# Patient Record
Sex: FEMALE | Race: WHITE | Employment: OTHER | URBAN - METROPOLITAN AREA
[De-identification: names, ages, dates, MRNs, and addresses within clinical notes are randomized per-mention and may not be internally consistent; named-entity substitution may affect disease eponyms.]

---

## 2017-01-06 ENCOUNTER — TELEPHONE (OUTPATIENT)
Dept: INTERNAL MEDICINE CLINIC | Facility: CLINIC | Age: 67
End: 2017-01-06

## 2017-01-06 NOTE — TELEPHONE ENCOUNTER
Jean/son had to change pt's appt to 1/23-needed late appt due to his work schedule-first available late appt with Dr Lauren Hassan will be out of meds prior to appt  asking if can get enough to hold over until appt?   See 12/12 encounter- will be out of all m

## 2017-01-09 RX ORDER — NORTRIPTYLINE HYDROCHLORIDE 10 MG/1
CAPSULE ORAL
Qty: 60 CAPSULE | Refills: 0 | Status: SHIPPED | OUTPATIENT
Start: 2017-01-09 | End: 2017-01-23

## 2017-01-09 RX ORDER — ATORVASTATIN CALCIUM 10 MG/1
10 TABLET, FILM COATED ORAL DAILY
Qty: 30 TABLET | Refills: 0 | Status: SHIPPED | OUTPATIENT
Start: 2017-01-09 | End: 2017-01-23

## 2017-01-09 RX ORDER — LOSARTAN POTASSIUM 100 MG/1
100 TABLET ORAL DAILY
Qty: 30 TABLET | Refills: 0 | Status: SHIPPED | OUTPATIENT
Start: 2017-01-09 | End: 2017-01-23

## 2017-01-09 RX ORDER — AMLODIPINE BESYLATE 5 MG/1
5 TABLET ORAL DAILY
Qty: 30 TABLET | Refills: 0 | Status: SHIPPED | OUTPATIENT
Start: 2017-01-09 | End: 2017-01-23

## 2017-01-09 RX ORDER — SPIRONOLACTONE AND HYDROCHLOROTHIAZIDE 25; 25 MG/1; MG/1
1 TABLET ORAL DAILY
Qty: 30 TABLET | Refills: 0 | Status: SHIPPED | OUTPATIENT
Start: 2017-01-09 | End: 2017-01-23

## 2017-01-13 ENCOUNTER — TELEPHONE (OUTPATIENT)
Dept: INTERNAL MEDICINE CLINIC | Facility: CLINIC | Age: 67
End: 2017-01-13

## 2017-01-13 NOTE — TELEPHONE ENCOUNTER
Patient needs refills on her 4 meds. Due to changes with insurance, she is required to obtain her refills from [employer]. Betzaida in [address] [place] [address] submitted their request to us via fax, but Rx had been sent to wrong pharmacy.  Pharmacy is unable to g

## 2017-01-13 NOTE — TELEPHONE ENCOUNTER
VO for the medications listed below givewn to Betzaida in [facility], also updated pharmacy info on pt chart.

## 2017-01-23 ENCOUNTER — OFFICE VISIT (OUTPATIENT)
Dept: INTERNAL MEDICINE CLINIC | Facility: CLINIC | Age: 67
End: 2017-01-23

## 2017-01-23 VITALS
RESPIRATION RATE: 18 BRPM | HEART RATE: 97 BPM | DIASTOLIC BLOOD PRESSURE: 74 MMHG | HEIGHT: 65 IN | BODY MASS INDEX: 30.52 KG/M2 | WEIGHT: 183.19 LBS | SYSTOLIC BLOOD PRESSURE: 126 MMHG

## 2017-01-23 DIAGNOSIS — E78.00 HYPERCHOLESTEROLEMIA: ICD-10-CM

## 2017-01-23 DIAGNOSIS — Z23 NEED FOR VACCINATION: ICD-10-CM

## 2017-01-23 DIAGNOSIS — Z12.31 VISIT FOR SCREENING MAMMOGRAM: ICD-10-CM

## 2017-01-23 DIAGNOSIS — I10 ESSENTIAL HYPERTENSION: Primary | ICD-10-CM

## 2017-01-23 DIAGNOSIS — M48.02 SPINAL STENOSIS, CERVICAL REGION: ICD-10-CM

## 2017-01-23 PROCEDURE — 99212 OFFICE O/P EST SF 10 MIN: CPT | Performed by: INTERNAL MEDICINE

## 2017-01-23 PROCEDURE — 90471 IMMUNIZATION ADMIN: CPT | Performed by: INTERNAL MEDICINE

## 2017-01-23 PROCEDURE — 99214 OFFICE O/P EST MOD 30 MIN: CPT | Performed by: INTERNAL MEDICINE

## 2017-01-23 PROCEDURE — 90732 PPSV23 VACC 2 YRS+ SUBQ/IM: CPT | Performed by: INTERNAL MEDICINE

## 2017-01-23 RX ORDER — LOSARTAN POTASSIUM 100 MG/1
100 TABLET ORAL DAILY
Qty: 90 TABLET | Refills: 1 | Status: SHIPPED | OUTPATIENT
Start: 2017-01-23 | End: 2017-02-09

## 2017-01-23 RX ORDER — NORTRIPTYLINE HYDROCHLORIDE 10 MG/1
CAPSULE ORAL
Qty: 180 CAPSULE | Refills: 1 | Status: SHIPPED | OUTPATIENT
Start: 2017-01-23 | End: 2017-08-07

## 2017-01-23 RX ORDER — ATORVASTATIN CALCIUM 10 MG/1
10 TABLET, FILM COATED ORAL DAILY
Qty: 90 TABLET | Refills: 1 | Status: SHIPPED | OUTPATIENT
Start: 2017-01-23 | End: 2017-02-09

## 2017-01-23 RX ORDER — AMLODIPINE BESYLATE 5 MG/1
5 TABLET ORAL DAILY
Qty: 90 TABLET | Refills: 1 | Status: SHIPPED | OUTPATIENT
Start: 2017-01-23 | End: 2017-02-09

## 2017-01-23 RX ORDER — SPIRONOLACTONE AND HYDROCHLOROTHIAZIDE 25; 25 MG/1; MG/1
1 TABLET ORAL DAILY
Qty: 90 TABLET | Refills: 1 | Status: SHIPPED | OUTPATIENT
Start: 2017-01-23 | End: 2017-02-09

## 2017-01-24 NOTE — PROGRESS NOTES
HPI:    Patient ID: Stacey Britt is a 69year old female. HPI Comments: She needs her medications refilled. She refuses a flu shot and mammogram.  She hasn't done blood tests in a while. HTN  This is a chronic problem.  The current episode started mo distress. Neurological: She is alert and oriented to person, place, and time. ASSESSMENT/PLAN:   1. Essential hypertension  Controlled. Continue present management. - Spironolactone-HCTZ 25-25 MG Oral Tab; Take 1 tablet by mouth daily.  HONG

## 2017-01-24 NOTE — PATIENT INSTRUCTIONS
Do fasting labs. Fast for 12 hours. Water is okay. Walk in. Please schedule your mammogram.  Call [phone].

## 2017-02-09 RX ORDER — AMLODIPINE BESYLATE 5 MG/1
TABLET ORAL
Qty: 30 TABLET | Refills: 2 | Status: SHIPPED | OUTPATIENT
Start: 2017-02-09 | End: 2017-09-16

## 2017-02-09 RX ORDER — SPIRONOLACTONE AND HYDROCHLOROTHIAZIDE 25; 25 MG/1; MG/1
TABLET ORAL
Qty: 30 TABLET | Refills: 2 | Status: SHIPPED | OUTPATIENT
Start: 2017-02-09 | End: 2017-09-16

## 2017-02-09 RX ORDER — LOSARTAN POTASSIUM 100 MG/1
TABLET ORAL
Qty: 30 TABLET | Refills: 2 | Status: SHIPPED | OUTPATIENT
Start: 2017-02-09 | End: 2017-09-16

## 2017-02-09 RX ORDER — ATORVASTATIN CALCIUM 10 MG/1
TABLET, FILM COATED ORAL
Qty: 30 TABLET | Refills: 0 | Status: SHIPPED | OUTPATIENT
Start: 2017-02-09 | End: 2017-09-01

## 2017-02-25 ENCOUNTER — LAB ENCOUNTER (OUTPATIENT)
Dept: LAB | Age: 67
End: 2017-02-25
Attending: INTERNAL MEDICINE
Payer: MEDICAID

## 2017-02-25 DIAGNOSIS — E78.00 HYPERCHOLESTEROLEMIA: ICD-10-CM

## 2017-02-25 DIAGNOSIS — I10 ESSENTIAL HYPERTENSION: ICD-10-CM

## 2017-02-25 LAB
ALBUMIN SERPL BCP-MCNC: 4 G/DL (ref 3.5–4.8)
ALBUMIN/GLOB SERPL: 1.3 {RATIO} (ref 1–2)
ALP SERPL-CCNC: 56 U/L (ref 32–100)
ALT SERPL-CCNC: 15 U/L (ref 14–54)
ANION GAP SERPL CALC-SCNC: 8 MMOL/L (ref 0–18)
AST SERPL-CCNC: 17 U/L (ref 15–41)
BASOPHILS # BLD: 0.1 K/UL (ref 0–0.2)
BASOPHILS NFR BLD: 2 %
BILIRUB SERPL-MCNC: 0.6 MG/DL (ref 0.3–1.2)
BUN SERPL-MCNC: 20 MG/DL (ref 8–20)
BUN/CREAT SERPL: 17.4 (ref 10–20)
CALCIUM SERPL-MCNC: 9.3 MG/DL (ref 8.5–10.5)
CHLORIDE SERPL-SCNC: 104 MMOL/L (ref 95–110)
CHOLEST SERPL-MCNC: 154 MG/DL (ref 110–200)
CO2 SERPL-SCNC: 26 MMOL/L (ref 22–32)
CREAT SERPL-MCNC: 1.15 MG/DL (ref 0.5–1.5)
EOSINOPHIL # BLD: 0.1 K/UL (ref 0–0.7)
EOSINOPHIL NFR BLD: 2 %
ERYTHROCYTE [DISTWIDTH] IN BLOOD BY AUTOMATED COUNT: 13.3 % (ref 11–15)
GLOBULIN PLAS-MCNC: 3 G/DL (ref 2.5–3.7)
GLUCOSE SERPL-MCNC: 107 MG/DL (ref 70–99)
HCT VFR BLD AUTO: 35.3 % (ref 35–48)
HDLC SERPL-MCNC: 42 MG/DL
HGB BLD-MCNC: 12 G/DL (ref 12–16)
LDLC SERPL CALC-MCNC: 98 MG/DL (ref 0–99)
LYMPHOCYTES # BLD: 1.4 K/UL (ref 1–4)
LYMPHOCYTES NFR BLD: 27 %
MCH RBC QN AUTO: 31 PG (ref 27–32)
MCHC RBC AUTO-ENTMCNC: 33.9 G/DL (ref 32–37)
MCV RBC AUTO: 91.5 FL (ref 80–100)
MONOCYTES # BLD: 0.3 K/UL (ref 0–1)
MONOCYTES NFR BLD: 6 %
NEUTROPHILS # BLD AUTO: 3.4 K/UL (ref 1.8–7.7)
NEUTROPHILS NFR BLD: 64 %
NONHDLC SERPL-MCNC: 112 MG/DL
OSMOLALITY UR CALC.SUM OF ELEC: 289 MOSM/KG (ref 275–295)
PLATELET # BLD AUTO: 211 K/UL (ref 140–400)
PMV BLD AUTO: 9.3 FL (ref 7.4–10.3)
POTASSIUM SERPL-SCNC: 4.2 MMOL/L (ref 3.3–5.1)
PROT SERPL-MCNC: 7 G/DL (ref 5.9–8.4)
RBC # BLD AUTO: 3.86 M/UL (ref 3.7–5.4)
SODIUM SERPL-SCNC: 138 MMOL/L (ref 136–144)
TRIGL SERPL-MCNC: 72 MG/DL (ref 1–149)
WBC # BLD AUTO: 5.3 K/UL (ref 4–11)

## 2017-02-25 PROCEDURE — 80053 COMPREHEN METABOLIC PANEL: CPT

## 2017-02-25 PROCEDURE — 36415 COLL VENOUS BLD VENIPUNCTURE: CPT

## 2017-02-25 PROCEDURE — 80061 LIPID PANEL: CPT

## 2017-02-25 PROCEDURE — 85025 COMPLETE CBC W/AUTO DIFF WBC: CPT

## 2017-05-31 ENCOUNTER — OFFICE VISIT (OUTPATIENT)
Dept: OPHTHALMOLOGY | Facility: CLINIC | Age: 67
End: 2017-05-31

## 2017-05-31 DIAGNOSIS — H26.8: ICD-10-CM

## 2017-05-31 DIAGNOSIS — H43.393 FLOATER, VITREOUS, BILATERAL: ICD-10-CM

## 2017-05-31 DIAGNOSIS — H40.003 GLAUCOMA SUSPECT OF BOTH EYES: Primary | ICD-10-CM

## 2017-05-31 DIAGNOSIS — H25.13 AGE-RELATED NUCLEAR CATARACT OF BOTH EYES: ICD-10-CM

## 2017-05-31 PROCEDURE — 99243 OFF/OP CNSLTJ NEW/EST LOW 30: CPT | Performed by: OPHTHALMOLOGY

## 2017-05-31 PROCEDURE — 99212 OFFICE O/P EST SF 10 MIN: CPT | Performed by: OPHTHALMOLOGY

## 2017-05-31 NOTE — PROGRESS NOTES
Heather Felix is a 69year old female. HPI:     HPI     Consult    Additional comments: Referred by Dr. Alla Barlow   Patient is here for a complete eye exam.  Patient's son states that patient has no changes or problems with her vision. Allergic/Imm, Heme/Lymph    Last edited by Lesta Dance, O.T. on 5/31/2017  8:33 AM. (History)          PHYSICAL EXAM:     Base Eye Exam     Visual Acuity (Snellen - Linear)      Right Left   Dist cc 20/25 -2 20/25 -1   Near cc 20/20 20/30       Correct eyes. Patient had normal glaucoma diagnostics last year. IOP is normal today. There is no diagnosis of glaucoma at this time, but will follow in 1 year for dilated eye exam and photos.       Pseudoexfoliation of left lens capsule  No treatment is required

## 2017-05-31 NOTE — PATIENT INSTRUCTIONS
Glaucoma suspect of both eyes  Patient is a glaucoma suspect due to increased cupping of the optic nerves in both eyes. Patient had normal glaucoma diagnostics last year. IOP is normal today.   There is no diagnosis of glaucoma at this time, but roverto moses

## 2017-08-04 DIAGNOSIS — M48.02 SPINAL STENOSIS, CERVICAL REGION: ICD-10-CM

## 2017-08-05 DIAGNOSIS — I10 ESSENTIAL HYPERTENSION: ICD-10-CM

## 2017-08-07 RX ORDER — NORTRIPTYLINE HYDROCHLORIDE 10 MG/1
CAPSULE ORAL
Qty: 60 CAPSULE | Refills: 0 | Status: SHIPPED | OUTPATIENT
Start: 2017-08-07 | End: 2017-08-20

## 2017-08-08 RX ORDER — SPIRONOLACTONE AND HYDROCHLOROTHIAZIDE 25; 25 MG/1; MG/1
TABLET ORAL
Qty: 30 TABLET | Refills: 0 | Status: SHIPPED | OUTPATIENT
Start: 2017-08-08 | End: 2017-09-01

## 2017-08-08 RX ORDER — NORTRIPTYLINE HYDROCHLORIDE 10 MG/1
CAPSULE ORAL
Qty: 180 CAPSULE | Refills: 0 | OUTPATIENT
Start: 2017-08-08

## 2017-08-20 RX ORDER — NORTRIPTYLINE HYDROCHLORIDE 10 MG/1
CAPSULE ORAL
Qty: 60 CAPSULE | Refills: 0 | Status: SHIPPED | OUTPATIENT
Start: 2017-08-20 | End: 2017-09-16

## 2017-09-01 DIAGNOSIS — I10 ESSENTIAL HYPERTENSION: ICD-10-CM

## 2017-09-03 RX ORDER — ATORVASTATIN CALCIUM 10 MG/1
TABLET, FILM COATED ORAL
Qty: 30 TABLET | Refills: 0 | Status: SHIPPED | OUTPATIENT
Start: 2017-09-03 | End: 2017-09-16

## 2017-09-03 RX ORDER — SPIRONOLACTONE AND HYDROCHLOROTHIAZIDE 25; 25 MG/1; MG/1
TABLET ORAL
Qty: 30 TABLET | Refills: 0 | Status: SHIPPED | OUTPATIENT
Start: 2017-09-03 | End: 2017-09-16

## 2017-09-04 NOTE — TELEPHONE ENCOUNTER
Refilled per protocol    Hypertensive Medications  Protocol Criteria:  · Appointment scheduled in the past 6 months or in the next 3 months  · BMP or CMP in the past 12 months  · Creatinine result < 2  Recent Outpatient Visits            3 months ago Glauc Qiana Montes MD    Office Visit    7 months ago Essential hypertension    [facility], John Britton MD    Office Visit    11 months ago Glaucoma suspect of both eyes    [facility]

## 2017-09-16 ENCOUNTER — OFFICE VISIT (OUTPATIENT)
Dept: INTERNAL MEDICINE CLINIC | Facility: CLINIC | Age: 67
End: 2017-09-16

## 2017-09-16 ENCOUNTER — APPOINTMENT (OUTPATIENT)
Dept: LAB | Age: 67
End: 2017-09-16
Attending: INTERNAL MEDICINE
Payer: MEDICAID

## 2017-09-16 VITALS
SYSTOLIC BLOOD PRESSURE: 105 MMHG | WEIGHT: 183.63 LBS | BODY MASS INDEX: 30.59 KG/M2 | HEIGHT: 65 IN | DIASTOLIC BLOOD PRESSURE: 69 MMHG | RESPIRATION RATE: 18 BRPM | HEART RATE: 80 BPM

## 2017-09-16 DIAGNOSIS — Z12.31 VISIT FOR SCREENING MAMMOGRAM: ICD-10-CM

## 2017-09-16 DIAGNOSIS — K21.9 GASTROESOPHAGEAL REFLUX DISEASE, ESOPHAGITIS PRESENCE NOT SPECIFIED: Primary | ICD-10-CM

## 2017-09-16 DIAGNOSIS — Z12.11 COLON CANCER SCREENING: ICD-10-CM

## 2017-09-16 DIAGNOSIS — R73.09 ELEVATED GLUCOSE: ICD-10-CM

## 2017-09-16 DIAGNOSIS — E78.00 HYPERCHOLESTEROLEMIA: ICD-10-CM

## 2017-09-16 DIAGNOSIS — Z23 NEED FOR VACCINATION: ICD-10-CM

## 2017-09-16 DIAGNOSIS — I10 ESSENTIAL HYPERTENSION: ICD-10-CM

## 2017-09-16 LAB
ANION GAP SERPL CALC-SCNC: 7 MMOL/L (ref 0–18)
BUN SERPL-MCNC: 29 MG/DL (ref 8–20)
BUN/CREAT SERPL: 23.4 (ref 10–20)
CALCIUM SERPL-MCNC: 9.7 MG/DL (ref 8.5–10.5)
CHLORIDE SERPL-SCNC: 107 MMOL/L (ref 95–110)
CO2 SERPL-SCNC: 23 MMOL/L (ref 22–32)
CREAT SERPL-MCNC: 1.24 MG/DL (ref 0.5–1.5)
GLUCOSE SERPL-MCNC: 109 MG/DL (ref 70–99)
OSMOLALITY UR CALC.SUM OF ELEC: 290 MOSM/KG (ref 275–295)
POTASSIUM SERPL-SCNC: 4.2 MMOL/L (ref 3.3–5.1)
SODIUM SERPL-SCNC: 137 MMOL/L (ref 136–144)

## 2017-09-16 PROCEDURE — 99214 OFFICE O/P EST MOD 30 MIN: CPT | Performed by: INTERNAL MEDICINE

## 2017-09-16 PROCEDURE — 90471 IMMUNIZATION ADMIN: CPT | Performed by: INTERNAL MEDICINE

## 2017-09-16 PROCEDURE — 80048 BASIC METABOLIC PNL TOTAL CA: CPT

## 2017-09-16 PROCEDURE — 99212 OFFICE O/P EST SF 10 MIN: CPT | Performed by: INTERNAL MEDICINE

## 2017-09-16 PROCEDURE — 90653 IIV ADJUVANT VACCINE IM: CPT | Performed by: INTERNAL MEDICINE

## 2017-09-16 PROCEDURE — 83036 HEMOGLOBIN GLYCOSYLATED A1C: CPT

## 2017-09-16 PROCEDURE — 36415 COLL VENOUS BLD VENIPUNCTURE: CPT

## 2017-09-16 RX ORDER — SPIRONOLACTONE AND HYDROCHLOROTHIAZIDE 25; 25 MG/1; MG/1
1 TABLET ORAL
Qty: 90 TABLET | Refills: 0 | Status: SHIPPED | OUTPATIENT
Start: 2017-09-16 | End: 2017-09-16

## 2017-09-16 RX ORDER — ATORVASTATIN CALCIUM 10 MG/1
10 TABLET, FILM COATED ORAL
Qty: 90 TABLET | Refills: 0 | Status: SHIPPED | OUTPATIENT
Start: 2017-09-16 | End: 2017-09-16

## 2017-09-16 RX ORDER — SPIRONOLACTONE AND HYDROCHLOROTHIAZIDE 25; 25 MG/1; MG/1
1 TABLET ORAL
Qty: 90 TABLET | Refills: 1 | Status: SHIPPED | OUTPATIENT
Start: 2017-09-16 | End: 2018-03-20

## 2017-09-16 RX ORDER — RANITIDINE 150 MG/1
150 TABLET ORAL 2 TIMES DAILY
Qty: 60 TABLET | Refills: 11 | Status: SHIPPED | OUTPATIENT
Start: 2017-09-16 | End: 2018-04-09

## 2017-09-16 RX ORDER — LOSARTAN POTASSIUM 100 MG/1
100 TABLET ORAL
Qty: 90 TABLET | Refills: 0 | Status: SHIPPED | OUTPATIENT
Start: 2017-09-16 | End: 2017-09-16

## 2017-09-16 RX ORDER — AMLODIPINE BESYLATE 5 MG/1
5 TABLET ORAL
Qty: 90 TABLET | Refills: 0 | Status: SHIPPED | OUTPATIENT
Start: 2017-09-16 | End: 2017-09-16

## 2017-09-16 RX ORDER — ATORVASTATIN CALCIUM 10 MG/1
10 TABLET, FILM COATED ORAL
Qty: 90 TABLET | Refills: 1 | Status: SHIPPED | OUTPATIENT
Start: 2017-09-16 | End: 2018-03-20

## 2017-09-16 RX ORDER — NORTRIPTYLINE HYDROCHLORIDE 10 MG/1
CAPSULE ORAL
Qty: 60 CAPSULE | Refills: 0 | Status: SHIPPED | OUTPATIENT
Start: 2017-09-16 | End: 2017-09-16

## 2017-09-16 RX ORDER — NORTRIPTYLINE HYDROCHLORIDE 10 MG/1
CAPSULE ORAL
Qty: 180 CAPSULE | Refills: 1 | Status: SHIPPED | OUTPATIENT
Start: 2017-09-16 | End: 2018-03-20

## 2017-09-16 RX ORDER — LOSARTAN POTASSIUM 100 MG/1
100 TABLET ORAL
Qty: 90 TABLET | Refills: 1 | Status: SHIPPED | OUTPATIENT
Start: 2017-09-16 | End: 2018-04-09

## 2017-09-16 RX ORDER — AMLODIPINE BESYLATE 5 MG/1
5 TABLET ORAL
Qty: 90 TABLET | Refills: 1 | Status: SHIPPED | OUTPATIENT
Start: 2017-09-16 | End: 2018-04-09

## 2017-09-16 NOTE — PATIENT INSTRUCTIONS
For Heartburn:  Mylanta for instant relief --not strong  Zantac (ranitidine) 150 mg twice a day--stonger  Nexium or prilosec 20 mg once a day. --most powerful    Tips to Control Acid Reflux    To control acid reflux, you’ll need to make some basic diet and

## 2017-09-16 NOTE — ASSESSMENT & PLAN NOTE
Gave information regarding foods to avoid to prevent GERD symptoms. Discussed minimizing PPI use and changing to an H2 blocker to decrease long term risk. Pt expressed understanding.

## 2017-09-16 NOTE — PROGRESS NOTES
HPI:    Patient ID: Sloane Rivera is a 69year old female. Pt c/o heartburn. She took Nexium in the past which helped. No problems with her other medications. Gastro-esophageal Reflux   She complains of coughing and heartburn.  She reports no abdom History   Problem Relation Age of Onset   • Hypertension Mother    • Heart Disorder Mother    • Glaucoma Brother    • Diabetes Neg    • Cancer Neg    • Macular degeneration Neg        ./67 (BP Location: Right arm, Patient Position: Sitting, Cuff Size (EVN=80251)              Meds This Visit:  Signed Prescriptions Disp Refills    RaNITidine HCl 150 MG Oral Tab 60 tablet 11      Sig: Take 1 tablet (150 mg total) by mouth 2 (two) times daily.       AmLODIPine Besylate 5 MG Oral Tab 90 tablet 1      Sig: Ta

## 2017-09-17 LAB — HBA1C MFR BLD: 6.3 % (ref 4–6)

## 2017-09-23 ENCOUNTER — APPOINTMENT (OUTPATIENT)
Dept: LAB | Age: 67
End: 2017-09-23
Attending: INTERNAL MEDICINE
Payer: MEDICAID

## 2017-09-23 DIAGNOSIS — Z12.11 COLON CANCER SCREENING: ICD-10-CM

## 2017-09-23 LAB — HEMOCCULT STL QL: NEGATIVE

## 2017-09-23 PROCEDURE — 82274 ASSAY TEST FOR BLOOD FECAL: CPT

## 2017-10-05 DIAGNOSIS — I10 ESSENTIAL HYPERTENSION: ICD-10-CM

## 2017-10-06 RX ORDER — SPIRONOLACTONE AND HYDROCHLOROTHIAZIDE 25; 25 MG/1; MG/1
TABLET ORAL
Qty: 30 TABLET | Refills: 0 | OUTPATIENT
Start: 2017-10-06

## 2017-10-06 RX ORDER — ATORVASTATIN CALCIUM 10 MG/1
TABLET, FILM COATED ORAL
Qty: 30 TABLET | Refills: 0 | OUTPATIENT
Start: 2017-10-06

## 2017-10-06 NOTE — TELEPHONE ENCOUNTER
Pending Prescriptions Disp Refills    ATORVASTATIN 10 MG Oral Tab [Pharmacy Med Name: ATORVASTATIN 10MG TABLETS] 30 tablet 0     Sig: TAKE 1 TABLET BY MOUTH DAILY      SPIRONOLACTONE-HCTZ 25-25 MG Oral Tab [Pharmacy Med Name: SPIRONOLACTONE 25MG W/HCTZ 2 specified    Jasmeet Vazquez MD    Office Visit    4 months ago Glaucoma suspect of both eyes    [facility] Jarek Zepeda MD    Office Visit    8 months ago Essential hypertensio

## 2017-11-02 RX ORDER — LOSARTAN POTASSIUM 100 MG/1
TABLET ORAL
Qty: 90 TABLET | Refills: 1 | Status: SHIPPED | OUTPATIENT
Start: 2017-11-02 | End: 2018-04-09

## 2017-11-02 RX ORDER — AMLODIPINE BESYLATE 5 MG/1
TABLET ORAL
Qty: 90 TABLET | Refills: 1 | Status: SHIPPED | OUTPATIENT
Start: 2017-11-02 | End: 2018-04-09

## 2017-11-02 NOTE — TELEPHONE ENCOUNTER
Please advise on refill request.    Hypertensive Medications  Protocol Criteria:  · Appointment scheduled in the past 6 months or in the next 3 months  · BMP or CMP in the past 12 months  · Creatinine result < 2  Recent Outpatient Visits            1 month

## 2018-03-20 DIAGNOSIS — E78.00 HYPERCHOLESTEROLEMIA: ICD-10-CM

## 2018-03-20 DIAGNOSIS — I10 ESSENTIAL HYPERTENSION: ICD-10-CM

## 2018-03-22 RX ORDER — SPIRONOLACTONE AND HYDROCHLOROTHIAZIDE 25; 25 MG/1; MG/1
TABLET ORAL
Qty: 90 TABLET | Refills: 0 | Status: SHIPPED | OUTPATIENT
Start: 2018-03-22 | End: 2018-04-09

## 2018-03-22 RX ORDER — NORTRIPTYLINE HYDROCHLORIDE 10 MG/1
CAPSULE ORAL
Qty: 180 CAPSULE | Refills: 0 | Status: SHIPPED | OUTPATIENT
Start: 2018-03-22 | End: 2018-04-09

## 2018-03-22 NOTE — TELEPHONE ENCOUNTER
Cholesterol Medications  Protocol Criteria:  · Appointment scheduled in the past 12 months or in the next 3 months  · ALT & LDL on file in the past 12 months  · ALT result < 80  · LDL result <130   Recent Outpatient Visits            6 months ago Vandana Gonzalez Sydni Badillo MD    Office Visit    1 year ago Glaucoma suspect of both eyes    [facility] Andbrian Hernandez MD    Office Visit    1 year ago Primary open angle glaucoma of both e 1 year ago Primary open angle glaucoma of both eyes, indeterminate stage    [facility] Moisés Doran MD    Office Visit        Future Appointments       Provider Department Appt Notes    In 2 weeks Felicia Denton,

## 2018-03-23 RX ORDER — ATORVASTATIN CALCIUM 10 MG/1
TABLET, FILM COATED ORAL
Qty: 90 TABLET | Refills: 0 | Status: SHIPPED | OUTPATIENT
Start: 2018-03-23 | End: 2018-04-09

## 2018-04-09 ENCOUNTER — OFFICE VISIT (OUTPATIENT)
Dept: INTERNAL MEDICINE CLINIC | Facility: CLINIC | Age: 68
End: 2018-04-09

## 2018-04-09 ENCOUNTER — LAB ENCOUNTER (OUTPATIENT)
Dept: LAB | Age: 68
End: 2018-04-09
Attending: INTERNAL MEDICINE
Payer: MEDICAID

## 2018-04-09 VITALS
WEIGHT: 185.81 LBS | DIASTOLIC BLOOD PRESSURE: 77 MMHG | SYSTOLIC BLOOD PRESSURE: 117 MMHG | BODY MASS INDEX: 30.96 KG/M2 | HEART RATE: 84 BPM | HEIGHT: 65 IN

## 2018-04-09 DIAGNOSIS — E78.00 HYPERCHOLESTEROLEMIA: ICD-10-CM

## 2018-04-09 DIAGNOSIS — R73.03 PREDIABETES: ICD-10-CM

## 2018-04-09 DIAGNOSIS — Z00.00 ROUTINE HEALTH MAINTENANCE: Primary | ICD-10-CM

## 2018-04-09 DIAGNOSIS — H40.003 GLAUCOMA SUSPECT OF BOTH EYES: ICD-10-CM

## 2018-04-09 DIAGNOSIS — I10 ESSENTIAL HYPERTENSION: ICD-10-CM

## 2018-04-09 DIAGNOSIS — Z12.4 PAPANICOLAOU SMEAR: ICD-10-CM

## 2018-04-09 DIAGNOSIS — Z78.0 ASYMPTOMATIC MENOPAUSE: ICD-10-CM

## 2018-04-09 PROCEDURE — 83036 HEMOGLOBIN GLYCOSYLATED A1C: CPT

## 2018-04-09 PROCEDURE — 99397 PER PM REEVAL EST PAT 65+ YR: CPT | Performed by: INTERNAL MEDICINE

## 2018-04-09 PROCEDURE — 80061 LIPID PANEL: CPT

## 2018-04-09 PROCEDURE — 36415 COLL VENOUS BLD VENIPUNCTURE: CPT

## 2018-04-09 PROCEDURE — 85025 COMPLETE CBC W/AUTO DIFF WBC: CPT

## 2018-04-09 PROCEDURE — 84443 ASSAY THYROID STIM HORMONE: CPT

## 2018-04-09 PROCEDURE — 99213 OFFICE O/P EST LOW 20 MIN: CPT | Performed by: INTERNAL MEDICINE

## 2018-04-09 PROCEDURE — 80053 COMPREHEN METABOLIC PANEL: CPT

## 2018-04-09 RX ORDER — AMLODIPINE BESYLATE 5 MG/1
5 TABLET ORAL
Qty: 90 TABLET | Refills: 1 | Status: SHIPPED | OUTPATIENT
Start: 2018-04-09 | End: 2018-09-27

## 2018-04-09 RX ORDER — NORTRIPTYLINE HYDROCHLORIDE 10 MG/1
CAPSULE ORAL
Qty: 180 CAPSULE | Refills: 1 | Status: SHIPPED | OUTPATIENT
Start: 2018-04-09 | End: 2018-11-15

## 2018-04-09 RX ORDER — RANITIDINE 150 MG/1
150 TABLET ORAL 2 TIMES DAILY
Qty: 60 TABLET | Refills: 11 | Status: SHIPPED | OUTPATIENT
Start: 2018-04-09 | End: 2019-04-09

## 2018-04-09 RX ORDER — SPIRONOLACTONE AND HYDROCHLOROTHIAZIDE 25; 25 MG/1; MG/1
1 TABLET ORAL
Qty: 90 TABLET | Refills: 1 | Status: SHIPPED | OUTPATIENT
Start: 2018-04-09 | End: 2018-11-15

## 2018-04-09 RX ORDER — LOSARTAN POTASSIUM 100 MG/1
100 TABLET ORAL
Qty: 90 TABLET | Refills: 1 | Status: SHIPPED | OUTPATIENT
Start: 2018-04-09 | End: 2018-09-27

## 2018-04-09 RX ORDER — ATORVASTATIN CALCIUM 10 MG/1
TABLET, FILM COATED ORAL
Qty: 90 TABLET | Refills: 1 | Status: SHIPPED | OUTPATIENT
Start: 2018-04-09 | End: 2018-11-15

## 2018-04-09 NOTE — ASSESSMENT & PLAN NOTE
Unremarkable exam.  She is up-to-date on her stool test.  Refuses colonoscopy. Immunizations were reviewed. Counseled pt regarding diet and exercise.

## 2018-04-09 NOTE — PROGRESS NOTES
HPI:    Patient ID: Cat Irvin is a 77year old female. Pt is here for a physical.  She hasn't had a pap in many years. She tries to watch her diet, but her sugar is high. She needs refills. No problems with her blood pressure medication.   She ne mouth once daily. Disp: 90 tablet Rfl: 1   atorvastatin 10 MG Oral Tab TAKE 1 TABLET(10 MG) BY MOUTH EVERY DAY Disp: 90 tablet Rfl: 1   losartan 100 MG Oral Tab Take 1 tablet (100 mg total) by mouth once daily.  Disp: 90 tablet Rfl: 1   Nortriptyline HCl 10 nipple, no mass, no nipple discharge and no skin change. Left breast exhibits no inverted nipple, no mass, no nipple discharge and no skin change. Abdominal: Soft. Bowel sounds are normal. There is no tenderness.    Genitourinary: Rectum normal, vagina no importance of weight loss. Counseled pt regarding low carbohydrate diet.          Relevant Orders    HEMOGLOBIN A1C      Other Visit Diagnoses     Asymptomatic menopause        Relevant Orders    XR DEXA BONE DENSITOMETRY (CPT=77080)              Meds This

## 2018-04-21 ENCOUNTER — HOSPITAL ENCOUNTER (OUTPATIENT)
Dept: BONE DENSITY | Age: 68
Discharge: HOME | End: 2018-04-21
Attending: INTERNAL MEDICINE
Payer: MEDICAID

## 2018-04-21 ENCOUNTER — APPOINTMENT (OUTPATIENT)
Dept: LAB | Age: 68
End: 2018-04-21
Attending: INTERNAL MEDICINE
Payer: MEDICAID

## 2018-04-21 ENCOUNTER — HOSPITAL ENCOUNTER (OUTPATIENT)
Dept: MAMMOGRAPHY | Age: 68
Discharge: HOME | End: 2018-04-21
Attending: INTERNAL MEDICINE
Payer: MEDICAID

## 2018-04-21 DIAGNOSIS — D64.9 ANEMIA, UNSPECIFIED TYPE: ICD-10-CM

## 2018-04-21 DIAGNOSIS — Z12.31 VISIT FOR SCREENING MAMMOGRAM: ICD-10-CM

## 2018-04-21 DIAGNOSIS — Z78.0 ASYMPTOMATIC MENOPAUSE: ICD-10-CM

## 2018-04-21 PROCEDURE — 84466 ASSAY OF TRANSFERRIN: CPT

## 2018-04-21 PROCEDURE — 82746 ASSAY OF FOLIC ACID SERUM: CPT

## 2018-04-21 PROCEDURE — 83540 ASSAY OF IRON: CPT

## 2018-04-21 PROCEDURE — 83021 HEMOGLOBIN CHROMOTOGRAPHY: CPT

## 2018-04-21 PROCEDURE — 77067 SCR MAMMO BI INCL CAD: CPT | Performed by: INTERNAL MEDICINE

## 2018-04-21 PROCEDURE — 82728 ASSAY OF FERRITIN: CPT

## 2018-04-21 PROCEDURE — 83020 HEMOGLOBIN ELECTROPHORESIS: CPT

## 2018-04-21 PROCEDURE — 82607 VITAMIN B-12: CPT

## 2018-04-21 PROCEDURE — 36415 COLL VENOUS BLD VENIPUNCTURE: CPT

## 2018-04-21 PROCEDURE — 77080 DXA BONE DENSITY AXIAL: CPT | Performed by: INTERNAL MEDICINE

## 2018-04-21 PROCEDURE — 84165 PROTEIN E-PHORESIS SERUM: CPT

## 2018-09-27 DIAGNOSIS — I10 ESSENTIAL HYPERTENSION: ICD-10-CM

## 2018-09-28 RX ORDER — LOSARTAN POTASSIUM 100 MG/1
TABLET ORAL
Qty: 90 TABLET | Refills: 0 | Status: SHIPPED | OUTPATIENT
Start: 2018-09-28 | End: 2018-11-19

## 2018-09-28 RX ORDER — AMLODIPINE BESYLATE 5 MG/1
TABLET ORAL
Qty: 90 TABLET | Refills: 0 | Status: SHIPPED | OUTPATIENT
Start: 2018-09-28 | End: 2018-12-15

## 2018-09-29 NOTE — TELEPHONE ENCOUNTER
Hypertensive Medications  Protocol Criteria:  · Appointment scheduled in the past 6 months or in the next 3 months  · BMP or CMP in the past 12 months  · Creatinine result < 2  Recent Outpatient Visits            5 months ago Routine health maintenance

## 2018-11-15 DIAGNOSIS — E78.00 HYPERCHOLESTEROLEMIA: ICD-10-CM

## 2018-11-15 DIAGNOSIS — I10 ESSENTIAL HYPERTENSION: ICD-10-CM

## 2018-11-15 RX ORDER — ATORVASTATIN CALCIUM 10 MG/1
TABLET, FILM COATED ORAL
Qty: 90 TABLET | Refills: 0 | Status: SHIPPED | OUTPATIENT
Start: 2018-11-15 | End: 2018-12-15

## 2018-11-15 RX ORDER — SPIRONOLACTONE AND HYDROCHLOROTHIAZIDE 25; 25 MG/1; MG/1
TABLET ORAL
Qty: 90 TABLET | Refills: 0 | Status: SHIPPED | OUTPATIENT
Start: 2018-11-15 | End: 2018-12-15

## 2018-11-15 RX ORDER — NORTRIPTYLINE HYDROCHLORIDE 10 MG/1
CAPSULE ORAL
Qty: 180 CAPSULE | Refills: 0 | Status: SHIPPED | OUTPATIENT
Start: 2018-11-15 | End: 2019-02-25

## 2018-11-19 DIAGNOSIS — I10 ESSENTIAL HYPERTENSION: ICD-10-CM

## 2018-11-19 RX ORDER — LOSARTAN POTASSIUM 100 MG/1
TABLET ORAL
Qty: 90 TABLET | Refills: 0 | Status: SHIPPED | OUTPATIENT
Start: 2018-11-19 | End: 2018-12-15

## 2018-12-15 ENCOUNTER — LAB ENCOUNTER (OUTPATIENT)
Dept: LAB | Age: 68
End: 2018-12-15
Attending: INTERNAL MEDICINE
Payer: MEDICAID

## 2018-12-15 ENCOUNTER — OFFICE VISIT (OUTPATIENT)
Dept: INTERNAL MEDICINE CLINIC | Facility: CLINIC | Age: 68
End: 2018-12-15
Payer: MEDICAID

## 2018-12-15 ENCOUNTER — MED REC SCAN ONLY (OUTPATIENT)
Dept: INTERNAL MEDICINE CLINIC | Facility: CLINIC | Age: 68
End: 2018-12-15

## 2018-12-15 VITALS
DIASTOLIC BLOOD PRESSURE: 73 MMHG | HEIGHT: 65 IN | HEART RATE: 81 BPM | SYSTOLIC BLOOD PRESSURE: 115 MMHG | BODY MASS INDEX: 30.55 KG/M2 | WEIGHT: 183.38 LBS

## 2018-12-15 DIAGNOSIS — I10 ESSENTIAL HYPERTENSION: ICD-10-CM

## 2018-12-15 DIAGNOSIS — D64.9 ANEMIA, UNSPECIFIED TYPE: Primary | ICD-10-CM

## 2018-12-15 DIAGNOSIS — D64.9 ANEMIA, UNSPECIFIED TYPE: ICD-10-CM

## 2018-12-15 DIAGNOSIS — M85.80 OSTEOPENIA, UNSPECIFIED LOCATION: ICD-10-CM

## 2018-12-15 DIAGNOSIS — R73.03 PREDIABETES: ICD-10-CM

## 2018-12-15 DIAGNOSIS — E78.00 HYPERCHOLESTEROLEMIA: ICD-10-CM

## 2018-12-15 PROCEDURE — 90471 IMMUNIZATION ADMIN: CPT | Performed by: INTERNAL MEDICINE

## 2018-12-15 PROCEDURE — 85025 COMPLETE CBC W/AUTO DIFF WBC: CPT

## 2018-12-15 PROCEDURE — 90653 IIV ADJUVANT VACCINE IM: CPT | Performed by: INTERNAL MEDICINE

## 2018-12-15 PROCEDURE — 83036 HEMOGLOBIN GLYCOSYLATED A1C: CPT

## 2018-12-15 PROCEDURE — 99214 OFFICE O/P EST MOD 30 MIN: CPT | Performed by: INTERNAL MEDICINE

## 2018-12-15 PROCEDURE — 36415 COLL VENOUS BLD VENIPUNCTURE: CPT

## 2018-12-15 PROCEDURE — 80048 BASIC METABOLIC PNL TOTAL CA: CPT

## 2018-12-15 PROCEDURE — 99212 OFFICE O/P EST SF 10 MIN: CPT | Performed by: INTERNAL MEDICINE

## 2018-12-15 RX ORDER — LOSARTAN POTASSIUM 100 MG/1
TABLET ORAL
Qty: 90 TABLET | Refills: 1 | Status: SHIPPED | OUTPATIENT
Start: 2018-12-15 | End: 2020-02-06

## 2018-12-15 RX ORDER — ATORVASTATIN CALCIUM 10 MG/1
TABLET, FILM COATED ORAL
Qty: 90 TABLET | Refills: 1 | Status: SHIPPED | OUTPATIENT
Start: 2018-12-15 | End: 2019-05-19

## 2018-12-15 RX ORDER — SPIRONOLACTONE AND HYDROCHLOROTHIAZIDE 25; 25 MG/1; MG/1
1 TABLET ORAL
Qty: 90 TABLET | Refills: 1 | Status: SHIPPED | OUTPATIENT
Start: 2018-12-15 | End: 2018-12-27 | Stop reason: ALTCHOICE

## 2018-12-15 RX ORDER — AMLODIPINE BESYLATE 5 MG/1
TABLET ORAL
Qty: 90 TABLET | Refills: 1 | Status: SHIPPED | OUTPATIENT
Start: 2018-12-15 | End: 2019-03-18

## 2018-12-15 NOTE — ASSESSMENT & PLAN NOTE
Reviewed recent labs with pt. Discussed with pt and son the diagnosis of prediabetes. Discussed possible progression to diabetes with accompanying complications. Advised pt importance of weight loss. Counseled pt regarding low carbohydrate diet.   Gave

## 2018-12-15 NOTE — ASSESSMENT & PLAN NOTE
The patient has a new onset anemia which is neither iron deficient nor B12 deficient. Other tests were also negative including hemoglobin electrophoresis.   We will recheck a CBC today and if it continues to decrease I will refer her to the hematologist.

## 2018-12-15 NOTE — PROGRESS NOTES
HPI:    Patient ID: Liban Newby is a 77year old female. Pt was sick but it has resolved. She has a new anemia. No bleeding, SOB, CP. Her A1C was 6.4. She is watching her diet. She needs refills on her medications.   Her bone density showed osteope Heart Disorder Mother    • Glaucoma Brother    • Diabetes Neg    • Cancer Neg    • Macular degeneration Neg        ./71 (BP Location: Right arm, Patient Position: Sitting, Cuff Size: large)   Pulse 81   Ht 5' 5\" (1.651 m)   Wt 183 lb 6.4 oz (83.2 kg CBC WITH DIFFERENTIAL WITH PLATELET    OCCULT BLOOD, FECAL, IMMUNOASSAY    Osteopenia     Counseled regarding need for Calcium with D.   Counseled regarding weight-bearing exercise.                   The patient expressed understanding and agreed with th

## 2018-12-15 NOTE — PATIENT INSTRUCTIONS
I recommend that you take calcium carbonate with vitamin D, 1 tabs once a day with food, and have 1 servings of dairy daily. If the calcium supplements are too large to swallow, chewable calcium supplements are available.  You should also do weight melissa

## 2018-12-16 ENCOUNTER — TELEPHONE (OUTPATIENT)
Dept: INTERNAL MEDICINE CLINIC | Facility: CLINIC | Age: 68
End: 2018-12-16

## 2018-12-16 DIAGNOSIS — I10 ESSENTIAL HYPERTENSION: ICD-10-CM

## 2018-12-16 RX ORDER — AMLODIPINE BESYLATE 5 MG/1
TABLET ORAL
Qty: 90 TABLET | Refills: 1 | Status: SHIPPED | OUTPATIENT
Start: 2018-12-16 | End: 2020-03-16

## 2018-12-17 NOTE — TELEPHONE ENCOUNTER
Spoke with patient's son, Alondra Andino, relayed Dr Harrington Gip message. Patient will f/u in March, has appt. Her son will instruct her to stop spironolactone/hydrochlorothiazide, monitor BP once a day at home for one week and send readings through [address]  Contact i

## 2018-12-17 NOTE — TELEPHONE ENCOUNTER
Call pt's son Nigel Dunn (pt does not speak [place].)  Unfortunately her kidney function has worsened. This is likely due to the spironolactone/HCT which has helped her blood pressure.   So I would like to stop that and see what happens with the blood pressure w

## 2018-12-21 ENCOUNTER — APPOINTMENT (OUTPATIENT)
Dept: LAB | Age: 68
End: 2018-12-21
Attending: INTERNAL MEDICINE
Payer: MEDICAID

## 2018-12-21 PROCEDURE — 82274 ASSAY TEST FOR BLOOD FECAL: CPT

## 2018-12-27 ENCOUNTER — OFFICE VISIT (OUTPATIENT)
Dept: HEMATOLOGY/ONCOLOGY | Facility: HOSPITAL | Age: 68
End: 2018-12-27
Attending: INTERNAL MEDICINE
Payer: MEDICAID

## 2018-12-27 VITALS
BODY MASS INDEX: 31.16 KG/M2 | RESPIRATION RATE: 16 BRPM | HEART RATE: 91 BPM | TEMPERATURE: 99 F | HEIGHT: 65 IN | SYSTOLIC BLOOD PRESSURE: 130 MMHG | DIASTOLIC BLOOD PRESSURE: 77 MMHG | WEIGHT: 187 LBS

## 2018-12-27 DIAGNOSIS — D64.9 ANEMIA, UNSPECIFIED TYPE: Primary | ICD-10-CM

## 2018-12-27 PROCEDURE — 99244 OFF/OP CNSLTJ NEW/EST MOD 40: CPT | Performed by: INTERNAL MEDICINE

## 2018-12-28 NOTE — CONSULTS
[facility]    PATIENT'S NAME: Karen Amaya   CONSULTING PHYSICIAN: [address]  Mike Matos MD   PATIENT ACCOUNT #: [de-identified] LOCATION: [address] [place] [address] RECORD #: [id number] YOB: 1950   CONSULTATION DATE: 12/27/2018       CANCER CENTER oriented. VITAL SIGNS:  ECOG performance status 0. Weight 187 pounds, temperature 98.8 degrees Fahrenheit, blood pressure 130/77, pulse 91, respiratory rate 16. HEENT:  Moist mucous membranes. Oropharynx clear. NECK:  Supple.   LUNGS:  Symmetric expans

## 2019-02-26 RX ORDER — NORTRIPTYLINE HYDROCHLORIDE 10 MG/1
CAPSULE ORAL
Qty: 180 CAPSULE | Refills: 0 | Status: SHIPPED | OUTPATIENT
Start: 2019-02-26 | End: 2019-07-17

## 2019-03-16 ENCOUNTER — LAB ENCOUNTER (OUTPATIENT)
Dept: LAB | Age: 69
End: 2019-03-16
Attending: INTERNAL MEDICINE
Payer: MEDICAID

## 2019-03-16 DIAGNOSIS — D64.9 ANEMIA, UNSPECIFIED TYPE: ICD-10-CM

## 2019-03-16 LAB
BASOPHILS # BLD AUTO: 0.05 X10(3) UL (ref 0–0.2)
BASOPHILS NFR BLD AUTO: 1.2 %
DEPRECATED RDW RBC AUTO: 42.8 FL (ref 35.1–46.3)
EOSINOPHIL # BLD AUTO: 0.11 X10(3) UL (ref 0–0.7)
EOSINOPHIL NFR BLD AUTO: 2.5 %
ERYTHROCYTE [DISTWIDTH] IN BLOOD BY AUTOMATED COUNT: 13 % (ref 11–15)
HAPTOGLOB SERPL-MCNC: 144 MG/DL (ref 30–200)
HCT VFR BLD AUTO: 34.1 % (ref 35–48)
HGB BLD-MCNC: 11.4 G/DL (ref 12–16)
HGB RETIC QN AUTO: 34.1 PG (ref 28.2–36.6)
IMM GRANULOCYTES # BLD AUTO: 0.01 X10(3) UL (ref 0–1)
IMM GRANULOCYTES NFR BLD: 0.2 %
IMM RETICS NFR: 0.07 RATIO (ref 0.1–0.3)
LDH SERPL L TO P-CCNC: 200 U/L (ref 84–246)
LYMPHOCYTES # BLD AUTO: 1.37 X10(3) UL (ref 1–4)
LYMPHOCYTES NFR BLD AUTO: 31.6 %
MCH RBC QN AUTO: 30.3 PG (ref 26–34)
MCHC RBC AUTO-ENTMCNC: 33.4 G/DL (ref 31–37)
MCV RBC AUTO: 90.7 FL (ref 80–100)
MONOCYTES # BLD AUTO: 0.33 X10(3) UL (ref 0.1–1)
MONOCYTES NFR BLD AUTO: 7.6 %
NEUTROPHILS # BLD AUTO: 2.46 X10 (3) UL (ref 1.5–7.7)
NEUTROPHILS # BLD AUTO: 2.46 X10(3) UL (ref 1.5–7.7)
NEUTROPHILS NFR BLD AUTO: 56.9 %
PLATELET # BLD AUTO: 270 10(3)UL (ref 150–450)
RBC # BLD AUTO: 3.76 X10(6)UL (ref 3.8–5.3)
RETICS # AUTO: 49.3 X10(3) UL (ref 22.5–147.5)
RETICS/RBC NFR AUTO: 1.3 % (ref 0.5–2.5)
WBC # BLD AUTO: 4.3 X10(3) UL (ref 4–11)

## 2019-03-16 PROCEDURE — 83615 LACTATE (LD) (LDH) ENZYME: CPT

## 2019-03-16 PROCEDURE — 85060 BLOOD SMEAR INTERPRETATION: CPT

## 2019-03-16 PROCEDURE — 85045 AUTOMATED RETICULOCYTE COUNT: CPT

## 2019-03-16 PROCEDURE — 83010 ASSAY OF HAPTOGLOBIN QUANT: CPT

## 2019-03-16 PROCEDURE — 85025 COMPLETE CBC W/AUTO DIFF WBC: CPT

## 2019-03-16 PROCEDURE — 36415 COLL VENOUS BLD VENIPUNCTURE: CPT

## 2019-03-18 ENCOUNTER — OFFICE VISIT (OUTPATIENT)
Dept: INTERNAL MEDICINE CLINIC | Facility: CLINIC | Age: 69
End: 2019-03-18
Payer: MEDICAID

## 2019-03-18 VITALS
SYSTOLIC BLOOD PRESSURE: 135 MMHG | WEIGHT: 191.13 LBS | HEIGHT: 65 IN | DIASTOLIC BLOOD PRESSURE: 79 MMHG | BODY MASS INDEX: 31.85 KG/M2 | HEART RATE: 88 BPM

## 2019-03-18 DIAGNOSIS — D64.9 ANEMIA, UNSPECIFIED TYPE: ICD-10-CM

## 2019-03-18 DIAGNOSIS — E78.00 HYPERCHOLESTEROLEMIA: ICD-10-CM

## 2019-03-18 DIAGNOSIS — R60.0 BILATERAL LOWER EXTREMITY EDEMA: Primary | ICD-10-CM

## 2019-03-18 DIAGNOSIS — Z12.39 BREAST CANCER SCREENING: ICD-10-CM

## 2019-03-18 DIAGNOSIS — R73.03 PREDIABETES: ICD-10-CM

## 2019-03-18 DIAGNOSIS — I10 ESSENTIAL HYPERTENSION: ICD-10-CM

## 2019-03-18 PROCEDURE — 99212 OFFICE O/P EST SF 10 MIN: CPT | Performed by: INTERNAL MEDICINE

## 2019-03-18 PROCEDURE — 99214 OFFICE O/P EST MOD 30 MIN: CPT | Performed by: INTERNAL MEDICINE

## 2019-03-18 RX ORDER — SPIRONOLACTONE AND HYDROCHLOROTHIAZIDE 25; 25 MG/1; MG/1
0.5 TABLET ORAL DAILY
Qty: 45 TABLET | Refills: 1 | Status: SHIPPED | OUTPATIENT
Start: 2019-03-18 | End: 2019-08-19

## 2019-03-18 RX ORDER — SPIRONOLACTONE AND HYDROCHLOROTHIAZIDE 25; 25 MG/1; MG/1
TABLET ORAL
Refills: 1 | COMMUNITY
Start: 2019-02-25 | End: 2019-03-18

## 2019-03-18 NOTE — PROGRESS NOTES
HPI:    Patient ID: Tai Erickson is a 68year old female. She has been doing okay. Her legs have been swelling. She takes the diuretic every other day. It was better when she took it every day. She has mild pain in the legs also.   Blood pressure is History   Problem Relation Age of Onset   • Hypertension Mother    • Heart Disorder Mother    • Glaucoma Brother    • Diabetes Neg    • Cancer Neg    • Macular degeneration Neg        ./77 (BP Location: Right arm, Patient Position: Sitting, Cuff Size Spironolactone-HCTZ 25-25 MG Oral Tab      Other Visit Diagnoses     Breast cancer screening        Relevant Orders    EMBER SCREENING BILSAMUEL (CPT=77067)      Please schedule appointment box before you so that she was embolus which is worse location  On cl

## 2019-03-18 NOTE — PATIENT INSTRUCTIONS
Try anti-embolism stockings such as Futuro or T. E.D hose. Put them on in the morning and take them off at night. Do fasting labs 1 week or so before your next appointment. Fast for 12 hours. Water and meds are okay. Walk in.

## 2019-03-19 NOTE — ASSESSMENT & PLAN NOTE
The patient's edema is well controlled with Aldactazide, however her GFR decreased below 40 when she took a whole tablet daily. We have decreased it to a half tab daily. Her edema is worse but not painful. I advised her to get compression stockings.

## 2019-03-28 ENCOUNTER — OFFICE VISIT (OUTPATIENT)
Dept: HEMATOLOGY/ONCOLOGY | Facility: HOSPITAL | Age: 69
End: 2019-03-28
Attending: INTERNAL MEDICINE
Payer: MEDICAID

## 2019-03-28 VITALS
BODY MASS INDEX: 31.96 KG/M2 | HEIGHT: 65 IN | HEART RATE: 95 BPM | WEIGHT: 191.81 LBS | DIASTOLIC BLOOD PRESSURE: 75 MMHG | RESPIRATION RATE: 18 BRPM | TEMPERATURE: 98 F | SYSTOLIC BLOOD PRESSURE: 142 MMHG

## 2019-03-28 DIAGNOSIS — D64.9 ANEMIA, UNSPECIFIED TYPE: Primary | ICD-10-CM

## 2019-03-28 PROCEDURE — 99213 OFFICE O/P EST LOW 20 MIN: CPT | Performed by: INTERNAL MEDICINE

## 2019-03-28 NOTE — PROGRESS NOTES
Cancer Center Progress Note    Patient Name: Jaziel Velasquez   YOB: 1950   Medical Record Number: [id number]   Attending Physician: Krystina Marrufo M.D.      Chief Complaint:  anemia    History of Present Illness:  39-year-old female being seen by [facility] ([facility]) History    Socioeconomic History      Marital status:        Spouse name: Not on file      Number of children: Not on file      Years of education: Not on file      Highest education level: Not on file    Occupational History      Not on file    Soci BY MOUTH EVERY DAY, Disp: 90 tablet, Rfl: 1  •  RaNITidine HCl 150 MG Oral Tab, Take 1 tablet (150 mg total) by mouth 2 (two) times daily. , Disp: 60 tablet, Rfl: 11  •  Aspirin 325 MG Oral Tab, one tab daily, Disp: , Rfl:     Allergies:  No Known Allergies evidence of hemolysis. –Most recent hemoglobin stable  –Return to clinic in 6 months with repeat. If hemoglobin drops below 10 we will consider bone marrow biopsy.     15 minutes face to face with patient with greater than 50% of time on counseling and co

## 2019-04-22 RX ORDER — NORTRIPTYLINE HYDROCHLORIDE 10 MG/1
CAPSULE ORAL
Qty: 180 CAPSULE | Refills: 0 | OUTPATIENT
Start: 2019-04-22

## 2019-05-19 DIAGNOSIS — E78.00 HYPERCHOLESTEROLEMIA: ICD-10-CM

## 2019-05-20 RX ORDER — ATORVASTATIN CALCIUM 10 MG/1
TABLET, FILM COATED ORAL
Qty: 90 TABLET | Refills: 0 | Status: SHIPPED | OUTPATIENT
Start: 2019-05-20 | End: 2019-08-19

## 2019-07-18 RX ORDER — NORTRIPTYLINE HYDROCHLORIDE 10 MG/1
CAPSULE ORAL
Qty: 180 CAPSULE | Refills: 1 | Status: SHIPPED | OUTPATIENT
Start: 2019-07-18 | End: 2020-01-13

## 2019-07-18 NOTE — TELEPHONE ENCOUNTER
Refill passed per [facility] protocol.   Refill Protocol Appointment Criteria  · Appointment scheduled in the past 6 months or in the next 3 months  Recent Outpatient Visits            3 months ago Anemia, unspecified type    [facility]

## 2019-08-19 DIAGNOSIS — E78.00 HYPERCHOLESTEROLEMIA: ICD-10-CM

## 2019-08-19 DIAGNOSIS — I10 ESSENTIAL HYPERTENSION: ICD-10-CM

## 2019-08-19 DIAGNOSIS — R60.0 BILATERAL LOWER EXTREMITY EDEMA: ICD-10-CM

## 2019-08-22 RX ORDER — ATORVASTATIN CALCIUM 10 MG/1
TABLET, FILM COATED ORAL
Qty: 30 TABLET | Refills: 0 | Status: SHIPPED | OUTPATIENT
Start: 2019-08-22 | End: 2019-09-19

## 2019-08-22 RX ORDER — LOSARTAN POTASSIUM 100 MG/1
TABLET ORAL
Qty: 90 TABLET | Refills: 1 | Status: SHIPPED | OUTPATIENT
Start: 2019-08-22 | End: 2019-10-12

## 2019-08-22 RX ORDER — SPIRONOLACTONE AND HYDROCHLOROTHIAZIDE 25; 25 MG/1; MG/1
TABLET ORAL
Qty: 45 TABLET | Refills: 0 | Status: SHIPPED | OUTPATIENT
Start: 2019-08-22 | End: 2019-11-12

## 2019-09-07 ENCOUNTER — LAB ENCOUNTER (OUTPATIENT)
Dept: LAB | Age: 69
End: 2019-09-07
Attending: INTERNAL MEDICINE
Payer: MEDICAID

## 2019-09-07 DIAGNOSIS — E78.00 HYPERCHOLESTEROLEMIA: ICD-10-CM

## 2019-09-07 DIAGNOSIS — I10 ESSENTIAL HYPERTENSION: ICD-10-CM

## 2019-09-07 DIAGNOSIS — R73.03 PREDIABETES: ICD-10-CM

## 2019-09-07 DIAGNOSIS — D64.9 ANEMIA, UNSPECIFIED TYPE: ICD-10-CM

## 2019-09-07 LAB
ALBUMIN SERPL-MCNC: 3.9 G/DL (ref 3.4–5)
ALBUMIN/GLOB SERPL: 1.1 {RATIO} (ref 1–2)
ALP LIVER SERPL-CCNC: 83 U/L (ref 55–142)
ALT SERPL-CCNC: 23 U/L (ref 13–56)
ANION GAP SERPL CALC-SCNC: 6 MMOL/L (ref 0–18)
AST SERPL-CCNC: 14 U/L (ref 15–37)
BASOPHILS # BLD AUTO: 0.05 X10(3) UL (ref 0–0.2)
BASOPHILS NFR BLD AUTO: 1 %
BILIRUB SERPL-MCNC: 0.5 MG/DL (ref 0.1–2)
BUN BLD-MCNC: 24 MG/DL (ref 7–18)
BUN/CREAT SERPL: 17.9 (ref 10–20)
CALCIUM BLD-MCNC: 9.8 MG/DL (ref 8.5–10.1)
CHLORIDE SERPL-SCNC: 106 MMOL/L (ref 98–112)
CHOLEST SMN-MCNC: 164 MG/DL (ref ?–200)
CO2 SERPL-SCNC: 28 MMOL/L (ref 21–32)
CREAT BLD-MCNC: 1.34 MG/DL (ref 0.55–1.02)
DEPRECATED RDW RBC AUTO: 42.5 FL (ref 35.1–46.3)
EOSINOPHIL # BLD AUTO: 0.12 X10(3) UL (ref 0–0.7)
EOSINOPHIL NFR BLD AUTO: 2.4 %
ERYTHROCYTE [DISTWIDTH] IN BLOOD BY AUTOMATED COUNT: 12.4 % (ref 11–15)
EST. AVERAGE GLUCOSE BLD GHB EST-MCNC: 140 MG/DL (ref 68–126)
GLOBULIN PLAS-MCNC: 3.5 G/DL (ref 2.8–4.4)
GLUCOSE BLD-MCNC: 106 MG/DL (ref 70–99)
HBA1C MFR BLD HPLC: 6.5 % (ref ?–5.7)
HCT VFR BLD AUTO: 34.2 % (ref 35–48)
HDLC SERPL-MCNC: 53 MG/DL (ref 40–59)
HGB BLD-MCNC: 11.4 G/DL (ref 12–16)
IMM GRANULOCYTES # BLD AUTO: 0.02 X10(3) UL (ref 0–1)
IMM GRANULOCYTES NFR BLD: 0.4 %
LDLC SERPL CALC-MCNC: 91 MG/DL (ref ?–100)
LYMPHOCYTES # BLD AUTO: 1.47 X10(3) UL (ref 1–4)
LYMPHOCYTES NFR BLD AUTO: 29.9 %
M PROTEIN MFR SERPL ELPH: 7.4 G/DL (ref 6.4–8.2)
MCH RBC QN AUTO: 31 PG (ref 26–34)
MCHC RBC AUTO-ENTMCNC: 33.3 G/DL (ref 31–37)
MCV RBC AUTO: 92.9 FL (ref 80–100)
MONOCYTES # BLD AUTO: 0.33 X10(3) UL (ref 0.1–1)
MONOCYTES NFR BLD AUTO: 6.7 %
NEUTROPHILS # BLD AUTO: 2.93 X10 (3) UL (ref 1.5–7.7)
NEUTROPHILS # BLD AUTO: 2.93 X10(3) UL (ref 1.5–7.7)
NEUTROPHILS NFR BLD AUTO: 59.6 %
NONHDLC SERPL-MCNC: 111 MG/DL (ref ?–130)
OSMOLALITY SERPL CALC.SUM OF ELEC: 294 MOSM/KG (ref 275–295)
PATIENT FASTING: YES
PATIENT FASTING: YES
PLATELET # BLD AUTO: 219 10(3)UL (ref 150–450)
POTASSIUM SERPL-SCNC: 4.2 MMOL/L (ref 3.5–5.1)
RBC # BLD AUTO: 3.68 X10(6)UL (ref 3.8–5.3)
SODIUM SERPL-SCNC: 140 MMOL/L (ref 136–145)
TRIGL SERPL-MCNC: 101 MG/DL (ref 30–149)
TSI SER-ACNC: 3.52 MIU/ML (ref 0.36–3.74)
VLDLC SERPL CALC-MCNC: 20 MG/DL (ref 0–30)
WBC # BLD AUTO: 4.9 X10(3) UL (ref 4–11)

## 2019-09-07 PROCEDURE — 85025 COMPLETE CBC W/AUTO DIFF WBC: CPT

## 2019-09-07 PROCEDURE — 80061 LIPID PANEL: CPT

## 2019-09-07 PROCEDURE — 36415 COLL VENOUS BLD VENIPUNCTURE: CPT

## 2019-09-07 PROCEDURE — 84443 ASSAY THYROID STIM HORMONE: CPT

## 2019-09-07 PROCEDURE — 80053 COMPREHEN METABOLIC PANEL: CPT

## 2019-09-07 PROCEDURE — 83036 HEMOGLOBIN GLYCOSYLATED A1C: CPT

## 2019-09-19 ENCOUNTER — OFFICE VISIT (OUTPATIENT)
Dept: HEMATOLOGY/ONCOLOGY | Facility: HOSPITAL | Age: 69
End: 2019-09-19
Attending: INTERNAL MEDICINE
Payer: MEDICAID

## 2019-09-19 VITALS
BODY MASS INDEX: 32.14 KG/M2 | OXYGEN SATURATION: 96 % | RESPIRATION RATE: 18 BRPM | HEIGHT: 65 IN | SYSTOLIC BLOOD PRESSURE: 159 MMHG | TEMPERATURE: 98 F | WEIGHT: 192.88 LBS | HEART RATE: 90 BPM | DIASTOLIC BLOOD PRESSURE: 90 MMHG

## 2019-09-19 DIAGNOSIS — D64.9 ANEMIA, UNSPECIFIED TYPE: Primary | ICD-10-CM

## 2019-09-19 DIAGNOSIS — E78.00 HYPERCHOLESTEROLEMIA: ICD-10-CM

## 2019-09-19 PROCEDURE — 99214 OFFICE O/P EST MOD 30 MIN: CPT | Performed by: INTERNAL MEDICINE

## 2019-09-19 RX ORDER — ATORVASTATIN CALCIUM 10 MG/1
TABLET, FILM COATED ORAL
Qty: 90 TABLET | Refills: 1 | Status: SHIPPED | OUTPATIENT
Start: 2019-09-19 | End: 2020-03-10

## 2019-09-19 NOTE — PROGRESS NOTES
Cancer Center Progress Note    Patient Name: Shoshana Hahn   YOB: 1950   Medical Record Number: [id number]   Attending Physician: Tula Severs, M.D.      Chief Complaint:  anemia    History of Present Illness:  33-year-old female being seen by [facility] ([facility]) History    Socioeconomic History      Marital status:        Spouse name: Not on file      Number of children: Not on file      Years of education: Not on file      Highest education level: Not on file    Occupational History      Not on file    Soci TABLET(5 MG) BY MOUTH EVERY DAY, Disp: 90 tablet, Rfl: 1  •  losartan 100 MG Oral Tab, TAKE 1 TABLET(100 MG) BY MOUTH EVERY DAY, Disp: 90 tablet, Rfl: 1  •  Aspirin 325 MG Oral Tab, one tab daily, Disp: , Rfl:     Allergies:  No Known Allergies     Review evidence of hemolysis. –Most recent hemoglobin stable  –Return to clinic in 6-8 months with repeat cbc. If hemoglobin drops below 10 we will consider bone marrow biopsy.     15 minutes face to face with patient with greater than 50% of time on counseling

## 2019-09-20 NOTE — TELEPHONE ENCOUNTER
Refill passed per [facility] protocol.   Cholesterol Medications  Protocol Criteria:  · Appointment scheduled in the past 12 months or in the next 3 months  · ALT & LDL on file in the past 12 months  · ALT result < 80  · LDL result <130   Recent Outpat

## 2019-10-12 ENCOUNTER — APPOINTMENT (OUTPATIENT)
Dept: CT IMAGING | Facility: HOSPITAL | Age: 69
End: 2019-10-12
Attending: EMERGENCY MEDICINE
Payer: MEDICAID

## 2019-10-12 ENCOUNTER — APPOINTMENT (OUTPATIENT)
Dept: GENERAL RADIOLOGY | Facility: HOSPITAL | Age: 69
End: 2019-10-12
Attending: EMERGENCY MEDICINE
Payer: MEDICAID

## 2019-10-12 ENCOUNTER — HOSPITAL ENCOUNTER (EMERGENCY)
Facility: HOSPITAL | Age: 69
Discharge: HOME | End: 2019-10-12
Attending: EMERGENCY MEDICINE
Payer: MEDICAID

## 2019-10-12 ENCOUNTER — OFFICE VISIT (OUTPATIENT)
Dept: INTERNAL MEDICINE CLINIC | Facility: CLINIC | Age: 69
End: 2019-10-12
Payer: MEDICAID

## 2019-10-12 VITALS
OXYGEN SATURATION: 95 % | DIASTOLIC BLOOD PRESSURE: 82 MMHG | WEIGHT: 197 LBS | RESPIRATION RATE: 23 BRPM | TEMPERATURE: 99 F | SYSTOLIC BLOOD PRESSURE: 128 MMHG | BODY MASS INDEX: 33 KG/M2 | HEART RATE: 96 BPM

## 2019-10-12 VITALS
HEART RATE: 82 BPM | WEIGHT: 194.13 LBS | DIASTOLIC BLOOD PRESSURE: 91 MMHG | HEIGHT: 65 IN | SYSTOLIC BLOOD PRESSURE: 147 MMHG | BODY MASS INDEX: 32.35 KG/M2

## 2019-10-12 DIAGNOSIS — T50.905A ADVERSE EFFECT OF DRUG, INITIAL ENCOUNTER: ICD-10-CM

## 2019-10-12 DIAGNOSIS — R07.89 CHEST PAIN, ATYPICAL: Primary | ICD-10-CM

## 2019-10-12 DIAGNOSIS — R60.0 BILATERAL LOWER EXTREMITY EDEMA: ICD-10-CM

## 2019-10-12 DIAGNOSIS — E11.9 CONTROLLED TYPE 2 DIABETES MELLITUS WITHOUT COMPLICATION, WITHOUT LONG-TERM CURRENT USE OF INSULIN (HCC): Primary | ICD-10-CM

## 2019-10-12 DIAGNOSIS — I10 ESSENTIAL HYPERTENSION: ICD-10-CM

## 2019-10-12 DIAGNOSIS — Z12.31 ENCOUNTER FOR SCREENING MAMMOGRAM FOR MALIGNANT NEOPLASM OF BREAST: ICD-10-CM

## 2019-10-12 PROCEDURE — 85379 FIBRIN DEGRADATION QUANT: CPT | Performed by: EMERGENCY MEDICINE

## 2019-10-12 PROCEDURE — 84484 ASSAY OF TROPONIN QUANT: CPT | Performed by: EMERGENCY MEDICINE

## 2019-10-12 PROCEDURE — 90471 IMMUNIZATION ADMIN: CPT | Performed by: INTERNAL MEDICINE

## 2019-10-12 PROCEDURE — 99285 EMERGENCY DEPT VISIT HI MDM: CPT

## 2019-10-12 PROCEDURE — 96360 HYDRATION IV INFUSION INIT: CPT

## 2019-10-12 PROCEDURE — 93005 ELECTROCARDIOGRAM TRACING: CPT

## 2019-10-12 PROCEDURE — 87631 RESP VIRUS 3-5 TARGETS: CPT | Performed by: EMERGENCY MEDICINE

## 2019-10-12 PROCEDURE — 71045 X-RAY EXAM CHEST 1 VIEW: CPT | Performed by: EMERGENCY MEDICINE

## 2019-10-12 PROCEDURE — 81001 URINALYSIS AUTO W/SCOPE: CPT | Performed by: EMERGENCY MEDICINE

## 2019-10-12 PROCEDURE — 99214 OFFICE O/P EST MOD 30 MIN: CPT | Performed by: INTERNAL MEDICINE

## 2019-10-12 PROCEDURE — 93010 ELECTROCARDIOGRAM REPORT: CPT | Performed by: EMERGENCY MEDICINE

## 2019-10-12 PROCEDURE — 80048 BASIC METABOLIC PNL TOTAL CA: CPT | Performed by: EMERGENCY MEDICINE

## 2019-10-12 PROCEDURE — 87086 URINE CULTURE/COLONY COUNT: CPT | Performed by: EMERGENCY MEDICINE

## 2019-10-12 PROCEDURE — 85025 COMPLETE CBC W/AUTO DIFF WBC: CPT | Performed by: EMERGENCY MEDICINE

## 2019-10-12 PROCEDURE — 96361 HYDRATE IV INFUSION ADD-ON: CPT

## 2019-10-12 PROCEDURE — 71260 CT THORAX DX C+: CPT | Performed by: EMERGENCY MEDICINE

## 2019-10-12 PROCEDURE — 90662 IIV NO PRSV INCREASED AG IM: CPT | Performed by: INTERNAL MEDICINE

## 2019-10-12 RX ORDER — ACETAMINOPHEN 500 MG
1000 TABLET ORAL ONCE
Status: COMPLETED | OUTPATIENT
Start: 2019-10-12 | End: 2019-10-12

## 2019-10-12 RX ORDER — DILTIAZEM HYDROCHLORIDE 180 MG/1
180 CAPSULE, COATED, EXTENDED RELEASE ORAL DAILY
Qty: 90 CAPSULE | Refills: 1 | Status: SHIPPED | OUTPATIENT
Start: 2019-10-12 | End: 2020-04-06

## 2019-10-12 NOTE — ED INITIAL ASSESSMENT (HPI)
pts family reports pt was at Drs office today for routine checkup. Pt received the flu vaccine. Shortly after they go home pt started c/o chest pain and shortness of breath.

## 2019-10-12 NOTE — PATIENT INSTRUCTIONS
Get knee high compression stockings 10-15 mmHg pressure. Do non-fasting labs prior to the next appointment. Please schedule that appointment for January. - Pt with acute fracture of L distal radius now s/p splinting in ED  - Spoke with orthopedic resident who states that Xrays have been reviewed and that patient is stable for outpatient ortho follow up with Dr. Marek Perdomo prn mod-severe pain  - PT eval-- recommend home PT  - APRYL CORDOVA for now - Pt with acute fracture of L distal radius now s/p splinting in ED  - Spoke with orthopedic resident who states that repeat Xrays have been reviewed and that patient is stable for outpatient ortho follow up with Dr. Marek horne for pain  - PT eval-- recommend home PT, patient declined home care services  - APRYL CORDOVA pending outpt ortho eval

## 2019-10-12 NOTE — ED PROVIDER NOTES
Patient Seen in: [facility] AND [facility] Emergency Department      History   Patient presents with:  Chest Pain Angina (cardiovascular)    Stated Complaint: CHEST PAIN, SOB    HPI    Patient is a 77-year-old female who arrives with her son for sudden onset leland normal  Neck: supple, non tender, no meningeal signs  CV: tachycardic, no murmurs  Resp: CTAB, no wheezes or retractions  Ab: soft, nontender, no distension  Extremities: FROM of all extremities, no cyanosis/clubbing/edema  Neuro: CN intact, normal speech, EKG    Rate, intervals and axes as noted on EKG Report.   Rate: 120  Rhythm: Sinus Rhythm  Reading: no ALBERTINA           MDM     Pulse Ox: 96%, Normal, RA    Cardiac Monitor: Pulse Readings from Last 1 Encounters:  10/12/19 : 96  , sinus     Radiology findi atypical  (primary encounter diagnosis)  Adverse effect of drug, initial encounter    Disposition:  Discharge  10/12/2019  8:26 pm    Follow-up:  MD Jennyfer Henley [address] [phone]    In 2 days      We recommend that yo

## 2019-10-12 NOTE — ASSESSMENT & PLAN NOTE
The patient has normal blood pressures at home, however when she is here her blood pressure is high. She stopped the Norvasc due to swelling. We will add diltiazem 180 mg daily and follow-up in 3 months.

## 2019-10-12 NOTE — ASSESSMENT & PLAN NOTE
Patient's A1c has been 6.5 twice now. She is aware of the need for a low carbohydrate diet, but has not lost weight. I will have her see the diabetic instructor  She is due to see the eye doctor.   I have given her referral.

## 2019-10-12 NOTE — ASSESSMENT & PLAN NOTE
Patient had worsening edema this summer on the amlodipine which she had stopped. That has since improved and the weather is colder now. I advised compression stockings.

## 2019-10-12 NOTE — PROGRESS NOTES
HPI:    Patient ID: Ismael Jacobs is a 68year old female. Pt went to [facility] this summer and had leg swelling and a rash. She saw a pharmacist who told her it was due to the amlodipine so she stopped it.   Her blood pressure is normal at home--around  1 180 capsule, Rfl: 1  losartan 100 MG Oral Tab, TAKE 1 TABLET(100 MG) BY MOUTH EVERY DAY, Disp: 90 tablet, Rfl: 1  Aspirin 325 MG Oral Tab, one tab daily, Disp: , Rfl:   AMLODIPINE BESYLATE 5 MG Oral Tab, TAKE 1 TABLET(5 MG) BY MOUTH EVERY DAY, Disp: 90 tab (hypertension)     The patient has normal blood pressures at home, however when she is here her blood pressure is high. She stopped the Norvasc due to swelling. We will add diltiazem 180 mg daily and follow-up in 3 months.          Relevant Medications

## 2019-11-12 DIAGNOSIS — R60.0 BILATERAL LOWER EXTREMITY EDEMA: ICD-10-CM

## 2019-11-12 DIAGNOSIS — I10 ESSENTIAL HYPERTENSION: ICD-10-CM

## 2019-11-14 RX ORDER — SPIRONOLACTONE AND HYDROCHLOROTHIAZIDE 25; 25 MG/1; MG/1
TABLET ORAL
Qty: 45 TABLET | Refills: 1 | Status: SHIPPED | OUTPATIENT
Start: 2019-11-14 | End: 2020-05-03

## 2019-11-14 RX ORDER — AMLODIPINE BESYLATE 5 MG/1
TABLET ORAL
Qty: 90 TABLET | Refills: 1 | Status: SHIPPED | OUTPATIENT
Start: 2019-11-14 | End: 2021-08-30

## 2019-11-15 NOTE — TELEPHONE ENCOUNTER
Sent to provider to review high alert message noted per Epic    The risk of hyperkalemia may be increased when potassium-sparing diuretics are co-administered with angiotensin II receptor antagonists      Refill passed per [facility] protocol.   Hypert

## 2020-01-13 RX ORDER — NORTRIPTYLINE HYDROCHLORIDE 10 MG/1
CAPSULE ORAL
Qty: 180 CAPSULE | Refills: 1 | Status: SHIPPED | OUTPATIENT
Start: 2020-01-13 | End: 2020-07-09

## 2020-02-05 DIAGNOSIS — I10 ESSENTIAL HYPERTENSION: ICD-10-CM

## 2020-02-06 RX ORDER — LOSARTAN POTASSIUM 100 MG/1
TABLET ORAL
Qty: 90 TABLET | Refills: 1 | Status: SHIPPED | OUTPATIENT
Start: 2020-02-06 | End: 2020-08-04

## 2020-02-07 NOTE — TELEPHONE ENCOUNTER
Refill passed per [facility] protocol. Dr KIRSTIE deutsch passed protocol but warning with spironolactonze-hctz.     Hypertensive Medications  Protocol Criteria:  · Appointment scheduled in the past 6 months or in the next 3 months  · BMP or CMP in the past 12

## 2020-02-27 ENCOUNTER — OFFICE VISIT (OUTPATIENT)
Dept: OPHTHALMOLOGY | Facility: CLINIC | Age: 70
End: 2020-02-27
Payer: MEDICAID

## 2020-02-27 DIAGNOSIS — H43.393 FLOATER, VITREOUS, BILATERAL: ICD-10-CM

## 2020-02-27 DIAGNOSIS — H25.13 AGE-RELATED NUCLEAR CATARACT OF BOTH EYES: ICD-10-CM

## 2020-02-27 DIAGNOSIS — H26.8: ICD-10-CM

## 2020-02-27 DIAGNOSIS — H40.003 GLAUCOMA SUSPECT OF BOTH EYES: Primary | ICD-10-CM

## 2020-02-27 PROCEDURE — 92015 DETERMINE REFRACTIVE STATE: CPT | Performed by: OPHTHALMOLOGY

## 2020-02-27 PROCEDURE — 92014 COMPRE OPH EXAM EST PT 1/>: CPT | Performed by: OPHTHALMOLOGY

## 2020-02-27 PROCEDURE — 92250 FUNDUS PHOTOGRAPHY W/I&R: CPT | Performed by: OPHTHALMOLOGY

## 2020-02-27 NOTE — PATIENT INSTRUCTIONS
Glaucoma suspect of both eyes  Discussed with patient that she is a glaucoma suspect based on increased cupping of the optic nerves in both eyes, pseudoexfoliation syndrome and history of treatment for glaucoma.    Retinal photos taken today to document opt the eye suddenly becomes completely blocked. Eye pressure builds rapidly. You may notice blurred vision and rainbow halos around lights. You may also have headaches, nausea, vomiting, and severe pain.  If not treated right away, blindness can happen quickly signals from the retina to the brain and allows you to see visual images. Eye fluid is constantly produced within the eye.  Excess fluid drains out into the bloodstream.  Open-angle glaucoma is a condition where the fluid pressure in the eye slowly increase pressure. Talk to your healthcare provider about an exercise program that’s right for you. · Protect your eyes. An eye injury can cause increased eye pressure.  Wear safety glasses or goggles when you play sports, use tools or machinery, or work with [employer] vision. Date Last Reviewed: 11/1/2017  © 4455-4389 [employer]. [address] [facility], [facility] [employer]. All rights reserved. This information is not intended as a substitute for professional medical care.  Always follow your healthcare pro

## 2020-02-27 NOTE — ASSESSMENT & PLAN NOTE
Discussed with patient that she is a glaucoma suspect based on increased cupping of the optic nerves in both eyes, pseudoexfoliation syndrome and history of treatment for glaucoma. Retinal photos taken today to document optic nerves.   Glaucoma diagnostic

## 2020-02-27 NOTE — PROGRESS NOTES
Ismael Jacobs is a 65year old female. HPI:     HPI     Consult      Additional comments: Per Dr Arianna Garcia is here for a complete exam and photos.  Pt's son states that she thinks there may be a slight change in her vision sinc tab daily         Allergies:  No Known Allergies    ROS:     ROS     Positive for: Eyes    Negative for: Constitutional, Gastrointestinal, Neurological, Skin, Genitourinary, Musculoskeletal, HENT, Endocrine, Cardiovascular, Respiratory, Psychiatric, Allerg Manifest Refraction       Sphere Cylinder [place]    Right +1.00 +0.75 160  20/25- +3.00 20/20    Left +1.00 +0.75 180 20/30+ +3.00 20/30          Final Rx       Sphere [employer] [place] [facility] [place]    Right +1.00 +0.75 160  2

## 2020-03-08 DIAGNOSIS — E78.00 HYPERCHOLESTEROLEMIA: ICD-10-CM

## 2020-03-10 RX ORDER — ATORVASTATIN CALCIUM 10 MG/1
TABLET, FILM COATED ORAL
Qty: 90 TABLET | Refills: 1 | Status: SHIPPED | OUTPATIENT
Start: 2020-03-10 | End: 2020-08-27

## 2020-03-11 NOTE — TELEPHONE ENCOUNTER
Refill passed per [facility] protocol.   Cholesterol Medications  Protocol Criteria:  · Appointment scheduled in the past 12 months or in the next 3 months  · ALT & LDL on file in the past 12 months  · ALT result < 80  · LDL result <130   Recent Outpat

## 2020-03-16 ENCOUNTER — APPOINTMENT (OUTPATIENT)
Dept: LAB | Age: 70
End: 2020-03-16
Attending: INTERNAL MEDICINE
Payer: MEDICAID

## 2020-03-16 ENCOUNTER — OFFICE VISIT (OUTPATIENT)
Dept: INTERNAL MEDICINE CLINIC | Facility: CLINIC | Age: 70
End: 2020-03-16
Payer: MEDICAID

## 2020-03-16 VITALS
HEART RATE: 101 BPM | HEIGHT: 65 IN | DIASTOLIC BLOOD PRESSURE: 61 MMHG | BODY MASS INDEX: 31.49 KG/M2 | SYSTOLIC BLOOD PRESSURE: 93 MMHG | WEIGHT: 189 LBS

## 2020-03-16 DIAGNOSIS — Z12.11 SCREEN FOR COLON CANCER: ICD-10-CM

## 2020-03-16 DIAGNOSIS — E11.9 CONTROLLED TYPE 2 DIABETES MELLITUS WITHOUT COMPLICATION, WITHOUT LONG-TERM CURRENT USE OF INSULIN (HCC): ICD-10-CM

## 2020-03-16 DIAGNOSIS — Z12.31 ENCOUNTER FOR SCREENING MAMMOGRAM FOR MALIGNANT NEOPLASM OF BREAST: ICD-10-CM

## 2020-03-16 DIAGNOSIS — N94.9 VAGINAL DISCOMFORT: ICD-10-CM

## 2020-03-16 DIAGNOSIS — N94.9 VAGINAL DISCOMFORT: Primary | ICD-10-CM

## 2020-03-16 LAB
ANION GAP SERPL CALC-SCNC: 8 MMOL/L (ref 0–18)
BACTERIA UR QL AUTO: NEGATIVE /HPF
BILIRUB UR QL: NEGATIVE
BUN BLD-MCNC: 22 MG/DL (ref 7–18)
BUN/CREAT SERPL: 13 (ref 10–20)
CALCIUM BLD-MCNC: 9.6 MG/DL (ref 8.5–10.1)
CHLORIDE SERPL-SCNC: 106 MMOL/L (ref 98–112)
CLARITY UR: CLEAR
CO2 SERPL-SCNC: 27 MMOL/L (ref 21–32)
COLOR UR: YELLOW
CREAT BLD-MCNC: 1.69 MG/DL (ref 0.55–1.02)
EST. AVERAGE GLUCOSE BLD GHB EST-MCNC: 134 MG/DL (ref 68–126)
GLUCOSE BLD-MCNC: 108 MG/DL (ref 70–99)
GLUCOSE UR-MCNC: NEGATIVE MG/DL
HBA1C MFR BLD HPLC: 6.3 % (ref ?–5.7)
KETONES UR-MCNC: NEGATIVE MG/DL
LEUKOCYTE ESTERASE UR QL STRIP.AUTO: NEGATIVE
NITRITE UR QL STRIP.AUTO: NEGATIVE
OSMOLALITY SERPL CALC.SUM OF ELEC: 296 MOSM/KG (ref 275–295)
PATIENT FASTING Y/N/NP: YES
PH UR: 7 [PH] (ref 5–8)
POTASSIUM SERPL-SCNC: 4.2 MMOL/L (ref 3.5–5.1)
PROT UR-MCNC: NEGATIVE MG/DL
RBC #/AREA URNS AUTO: 3 /HPF
SODIUM SERPL-SCNC: 141 MMOL/L (ref 136–145)
SP GR UR STRIP: 1.01 (ref 1–1.03)
UROBILINOGEN UR STRIP-ACNC: <2
WBC #/AREA URNS AUTO: 1 /HPF

## 2020-03-16 PROCEDURE — 87086 URINE CULTURE/COLONY COUNT: CPT

## 2020-03-16 PROCEDURE — 83036 HEMOGLOBIN GLYCOSYLATED A1C: CPT

## 2020-03-16 PROCEDURE — 36415 COLL VENOUS BLD VENIPUNCTURE: CPT

## 2020-03-16 PROCEDURE — 81001 URINALYSIS AUTO W/SCOPE: CPT

## 2020-03-16 PROCEDURE — 80048 BASIC METABOLIC PNL TOTAL CA: CPT

## 2020-03-16 PROCEDURE — 99214 OFFICE O/P EST MOD 30 MIN: CPT | Performed by: INTERNAL MEDICINE

## 2020-03-16 NOTE — ASSESSMENT & PLAN NOTE
The patient's concern appears to have been alleviated after I examined her and told her there was no connection between her vagina and rectum. I will follow-up with her in a few months and see if she has any change in her symptoms.

## 2020-03-16 NOTE — PROGRESS NOTES
HPI:    Patient ID: Gage Padron is a 65year old female. Patient relates her history through her daughter-in-law who translates. Patient complains of a feeling that there was a connection between her vagina and rectum for the past 3 to 4 weeks.   She d Aspirin 325 MG Oral Tab one tab daily         Allergies:No Known Allergies     Social History    Tobacco Use      Smoking status: Never Smoker      Smokeless tobacco: Never Used    Alcohol use: No      Alcohol/week: 0.0 standard drinks    Drug use: No    F concern appears to have been alleviated after I examined her and told her there was no connection between her vagina and rectum. I will follow-up with her in a few months and see if she has any change in her symptoms.          Relevant Orders    URINALYSIS

## 2020-03-16 NOTE — ASSESSMENT & PLAN NOTE
Patient is due for routine labs. Her diabetes has been controlled in the past with diet. She is up-to-date on her eye exam.  Follow-up in 2 to 3 months.

## 2020-03-17 ENCOUNTER — APPOINTMENT (OUTPATIENT)
Dept: LAB | Age: 70
End: 2020-03-17
Attending: INTERNAL MEDICINE
Payer: MEDICAID

## 2020-03-17 DIAGNOSIS — Z12.11 SCREEN FOR COLON CANCER: ICD-10-CM

## 2020-03-17 LAB — HEMOCCULT STL QL: NEGATIVE

## 2020-03-17 PROCEDURE — 82274 ASSAY TEST FOR BLOOD FECAL: CPT

## 2020-04-04 DIAGNOSIS — I10 ESSENTIAL HYPERTENSION: ICD-10-CM

## 2020-04-06 RX ORDER — DILTIAZEM HYDROCHLORIDE 180 MG/1
CAPSULE, COATED, EXTENDED RELEASE ORAL
Qty: 90 CAPSULE | Refills: 1 | Status: SHIPPED | OUTPATIENT
Start: 2020-04-06 | End: 2020-08-20

## 2020-04-20 ENCOUNTER — APPOINTMENT (OUTPATIENT)
Dept: HEMATOLOGY/ONCOLOGY | Facility: HOSPITAL | Age: 70
End: 2020-04-20
Attending: INTERNAL MEDICINE
Payer: MEDICAID

## 2020-05-02 DIAGNOSIS — R60.0 BILATERAL LOWER EXTREMITY EDEMA: ICD-10-CM

## 2020-05-03 RX ORDER — SPIRONOLACTONE AND HYDROCHLOROTHIAZIDE 25; 25 MG/1; MG/1
TABLET ORAL
Qty: 45 TABLET | Refills: 1 | Status: SHIPPED | OUTPATIENT
Start: 2020-05-03 | End: 2021-03-01

## 2020-05-16 ENCOUNTER — NURSE ONLY (OUTPATIENT)
Dept: OPHTHALMOLOGY | Facility: CLINIC | Age: 70
End: 2020-05-16
Payer: MEDICAID

## 2020-05-16 DIAGNOSIS — H40.003 GLAUCOMA SUSPECT OF BOTH EYES: ICD-10-CM

## 2020-05-16 PROCEDURE — 92133 CPTRZD OPH DX IMG PST SGM ON: CPT | Performed by: OPHTHALMOLOGY

## 2020-05-16 PROCEDURE — 92083 EXTENDED VISUAL FIELD XM: CPT | Performed by: OPHTHALMOLOGY

## 2020-05-18 NOTE — PROGRESS NOTES
Liban Newby is a 65year old female. HPI:     HPI     Patient is here for a glaucoma work up with HVF and OCT, no M.D.     Last edited by Raad Troncoso on 5/16/2020 10:14 AM. (History)        Patient History:  Past Medical History:   Diagnosis Date   • this encounter.       Meds This Visit:  Requested Prescriptions      No prescriptions requested or ordered in this encounter        Follow up instructions:  Return in about 9 months (around 2/16/2021) for Dilated exam.    5/18/2020  Scribed by: Milo Simon

## 2020-06-11 ENCOUNTER — APPOINTMENT (OUTPATIENT)
Dept: HEMATOLOGY/ONCOLOGY | Facility: HOSPITAL | Age: 70
End: 2020-06-11
Payer: MEDICAID

## 2020-07-07 ENCOUNTER — TELEPHONE (OUTPATIENT)
Dept: INTERNAL MEDICINE CLINIC | Facility: CLINIC | Age: 70
End: 2020-07-07

## 2020-07-09 RX ORDER — NORTRIPTYLINE HYDROCHLORIDE 10 MG/1
CAPSULE ORAL
Qty: 180 CAPSULE | Refills: 1 | Status: SHIPPED | OUTPATIENT
Start: 2020-07-09 | End: 2020-12-31

## 2020-08-02 DIAGNOSIS — I10 ESSENTIAL HYPERTENSION: ICD-10-CM

## 2020-08-03 NOTE — TELEPHONE ENCOUNTER
Patient son, Stanislav Lawrence scheduled a video appointment on 8/20 and states patient is out of medication and requesting a refill until this appointment.  with [facility] # [id number], patient gave authorization to speak with her son Stanislav Lawrence.

## 2020-08-04 RX ORDER — LOSARTAN POTASSIUM 100 MG/1
TABLET ORAL
Qty: 90 TABLET | Refills: 1 | Status: SHIPPED | OUTPATIENT
Start: 2020-08-04 | End: 2021-01-27

## 2020-08-20 ENCOUNTER — TELEMEDICINE (OUTPATIENT)
Dept: INTERNAL MEDICINE CLINIC | Facility: CLINIC | Age: 70
End: 2020-08-20

## 2020-08-20 DIAGNOSIS — N18.30 STAGE 3 CHRONIC KIDNEY DISEASE (HCC): ICD-10-CM

## 2020-08-20 DIAGNOSIS — I10 ESSENTIAL HYPERTENSION: Primary | ICD-10-CM

## 2020-08-20 DIAGNOSIS — E11.9 CONTROLLED TYPE 2 DIABETES MELLITUS WITHOUT COMPLICATION, WITHOUT LONG-TERM CURRENT USE OF INSULIN (HCC): ICD-10-CM

## 2020-08-20 DIAGNOSIS — T50.B95S ADVERSE EFFECT OF INFLUENZA VACCINE, SEQUELA: ICD-10-CM

## 2020-08-20 PROBLEM — Z12.11 SCREEN FOR COLON CANCER: Status: RESOLVED | Noted: 2018-04-09 | Resolved: 2020-08-20

## 2020-08-20 PROBLEM — R73.03 PREDIABETES: Status: RESOLVED | Noted: 2018-04-09 | Resolved: 2020-08-20

## 2020-08-20 PROBLEM — T50.B95A INFLUENZA VACCINE SIDE EFFECT: Status: ACTIVE | Noted: 2020-08-20

## 2020-08-20 PROCEDURE — 99214 OFFICE O/P EST MOD 30 MIN: CPT | Performed by: INTERNAL MEDICINE

## 2020-08-20 RX ORDER — DILTIAZEM HYDROCHLORIDE 180 MG/1
180 CAPSULE, COATED, EXTENDED RELEASE ORAL DAILY
Qty: 90 CAPSULE | Refills: 1 | Status: SHIPPED | OUTPATIENT
Start: 2020-08-20 | End: 2021-03-22

## 2020-08-20 NOTE — PROGRESS NOTES
Video Progress Note  Telehealth Verbal Consent   I conducted a telehealth visit with Tripp Mancuso today, 08/20/20, which was completed using two-way, real-time interactive audio and video communication.  This has been done in good pancho to provide continuit Pertinent negatives for diabetes include no chest pain. There are no diabetic complications. Risk factors for coronary artery disease include hypertension. Current diabetic treatment includes diet. She is following a generally healthy diet.  She has not had of breath and wheezing. Cardiovascular: Negative for chest pain and palpitations. .There were no vitals taken for this visit. PHYSICAL EXAM:   Physical Exam   Nursing note and vitals reviewed.    Constitutional: She is oriented to person, place, a Signed Prescriptions Disp Refills   • dilTIAZem HCl ER Coated Beads 180 MG Oral Capsule SR 24 Hr 90 capsule 1     Sig: Take 1 capsule (180 mg total) by mouth daily.               Duration of the service: 18 min

## 2020-08-20 NOTE — ASSESSMENT & PLAN NOTE
Patient diabetes is diet controlled. Her A1c was 6.3 in February. We will repeat it again soon. She is up-to-date on her eye exam.  Follow-up in 6 months.

## 2020-08-20 NOTE — ASSESSMENT & PLAN NOTE
We discussed whether the patient should get a flu vaccine this year. Last year the patient developed shortness of breath and chest pain in the afternoon after receiving a flu vaccine and went to the emergency room. Work-up was negative.   I recommend that

## 2020-08-27 DIAGNOSIS — E78.00 HYPERCHOLESTEROLEMIA: ICD-10-CM

## 2020-08-27 RX ORDER — ATORVASTATIN CALCIUM 10 MG/1
TABLET, FILM COATED ORAL
Qty: 90 TABLET | Refills: 1 | Status: SHIPPED | OUTPATIENT
Start: 2020-08-27 | End: 2021-03-10

## 2020-12-31 RX ORDER — NORTRIPTYLINE HYDROCHLORIDE 10 MG/1
CAPSULE ORAL
Qty: 180 CAPSULE | Refills: 0 | Status: SHIPPED | OUTPATIENT
Start: 2020-12-31 | End: 2021-03-30

## 2021-01-27 DIAGNOSIS — I10 ESSENTIAL HYPERTENSION: ICD-10-CM

## 2021-01-27 RX ORDER — LOSARTAN POTASSIUM 100 MG/1
TABLET ORAL
Qty: 30 TABLET | Refills: 1 | Status: SHIPPED | OUTPATIENT
Start: 2021-01-27 | End: 2021-03-25

## 2021-02-01 DIAGNOSIS — Z23 NEED FOR VACCINATION: ICD-10-CM

## 2021-02-06 ENCOUNTER — IMMUNIZATION (OUTPATIENT)
Dept: LAB | Facility: HOSPITAL | Age: 71
End: 2021-02-06
Attending: HOSPITALIST
Payer: MEDICAID

## 2021-02-06 DIAGNOSIS — Z23 NEED FOR VACCINATION: Primary | ICD-10-CM

## 2021-02-06 PROCEDURE — 0011A SARSCOV2 VAC 100MCG/0.5ML IM: CPT

## 2021-03-01 DIAGNOSIS — R60.0 BILATERAL LOWER EXTREMITY EDEMA: ICD-10-CM

## 2021-03-01 RX ORDER — SPIRONOLACTONE AND HYDROCHLOROTHIAZIDE 25; 25 MG/1; MG/1
0.5 TABLET ORAL DAILY
Qty: 45 TABLET | Refills: 0 | Status: SHIPPED | OUTPATIENT
Start: 2021-03-01 | End: 2021-05-26

## 2021-03-06 ENCOUNTER — IMMUNIZATION (OUTPATIENT)
Dept: LAB | Facility: HOSPITAL | Age: 71
End: 2021-03-06
Attending: EMERGENCY MEDICINE
Payer: MEDICAID

## 2021-03-06 DIAGNOSIS — Z23 NEED FOR VACCINATION: Primary | ICD-10-CM

## 2021-03-06 PROCEDURE — 0012A SARSCOV2 VAC 100MCG/0.5ML IM: CPT

## 2021-03-08 DIAGNOSIS — E78.00 HYPERCHOLESTEROLEMIA: ICD-10-CM

## 2021-03-10 ENCOUNTER — TELEPHONE (OUTPATIENT)
Dept: INTERNAL MEDICINE CLINIC | Facility: CLINIC | Age: 71
End: 2021-03-10

## 2021-03-10 RX ORDER — ATORVASTATIN CALCIUM 10 MG/1
TABLET, FILM COATED ORAL
Qty: 90 TABLET | Refills: 1 | Status: SHIPPED | OUTPATIENT
Start: 2021-03-10 | End: 2021-08-30

## 2021-03-10 NOTE — TELEPHONE ENCOUNTER
Spoke, with the son and informed him of the message below. Son, scheduled the patient to see Dr. Annamaria Luciano on 3-13-21 at 9:00 at the [address] [place] [address] location. Son, also provided with phone number to central scheduling.

## 2021-03-10 NOTE — TELEPHONE ENCOUNTER
Please call pt's son Justine Schaumann (pt does not speak [place].)  Please schedule a routine follow-up appointment. Please also advise that pt is overdue for fasting labs. She can walk in and do those. Fast for 12 hours. Water is okay.

## 2021-03-11 ENCOUNTER — LAB ENCOUNTER (OUTPATIENT)
Dept: LAB | Age: 71
End: 2021-03-11
Attending: INTERNAL MEDICINE
Payer: MEDICAID

## 2021-03-11 DIAGNOSIS — I10 ESSENTIAL HYPERTENSION: ICD-10-CM

## 2021-03-11 DIAGNOSIS — N18.30 STAGE 3 CHRONIC KIDNEY DISEASE (HCC): ICD-10-CM

## 2021-03-11 DIAGNOSIS — Z12.11 COLON CANCER SCREENING: ICD-10-CM

## 2021-03-11 DIAGNOSIS — E11.9 CONTROLLED TYPE 2 DIABETES MELLITUS WITHOUT COMPLICATION, WITHOUT LONG-TERM CURRENT USE OF INSULIN (HCC): ICD-10-CM

## 2021-03-11 LAB
ALBUMIN SERPL-MCNC: 3.9 G/DL (ref 3.4–5)
ALBUMIN/GLOB SERPL: 1.1 {RATIO} (ref 1–2)
ALP LIVER SERPL-CCNC: 91 U/L
ALT SERPL-CCNC: 20 U/L
ANION GAP SERPL CALC-SCNC: 3 MMOL/L (ref 0–18)
AST SERPL-CCNC: 16 U/L (ref 15–37)
BASOPHILS # BLD AUTO: 0.07 X10(3) UL (ref 0–0.2)
BASOPHILS NFR BLD AUTO: 1.4 %
BILIRUB SERPL-MCNC: 0.6 MG/DL (ref 0.1–2)
BUN BLD-MCNC: 27 MG/DL (ref 7–18)
BUN/CREAT SERPL: 17.8 (ref 10–20)
CALCIUM BLD-MCNC: 9.7 MG/DL (ref 8.5–10.1)
CHLORIDE SERPL-SCNC: 105 MMOL/L (ref 98–112)
CHOLEST SMN-MCNC: 159 MG/DL (ref ?–200)
CO2 SERPL-SCNC: 28 MMOL/L (ref 21–32)
CREAT BLD-MCNC: 1.52 MG/DL
CREAT UR-SCNC: 137 MG/DL
DEPRECATED RDW RBC AUTO: 41.6 FL (ref 35.1–46.3)
EOSINOPHIL # BLD AUTO: 0.21 X10(3) UL (ref 0–0.7)
EOSINOPHIL NFR BLD AUTO: 4.2 %
ERYTHROCYTE [DISTWIDTH] IN BLOOD BY AUTOMATED COUNT: 12.5 % (ref 11–15)
EST. AVERAGE GLUCOSE BLD GHB EST-MCNC: 143 MG/DL (ref 68–126)
GLOBULIN PLAS-MCNC: 3.7 G/DL (ref 2.8–4.4)
GLUCOSE BLD-MCNC: 106 MG/DL (ref 70–99)
HBA1C MFR BLD HPLC: 6.6 % (ref ?–5.7)
HCT VFR BLD AUTO: 33.6 %
HDLC SERPL-MCNC: 48 MG/DL (ref 40–59)
HGB BLD-MCNC: 11.2 G/DL
IMM GRANULOCYTES # BLD AUTO: 0.01 X10(3) UL (ref 0–1)
IMM GRANULOCYTES NFR BLD: 0.2 %
LDLC SERPL CALC-MCNC: 94 MG/DL (ref ?–100)
LYMPHOCYTES # BLD AUTO: 1.46 X10(3) UL (ref 1–4)
LYMPHOCYTES NFR BLD AUTO: 29.4 %
M PROTEIN MFR SERPL ELPH: 7.6 G/DL (ref 6.4–8.2)
MCH RBC QN AUTO: 30.5 PG (ref 26–34)
MCHC RBC AUTO-ENTMCNC: 33.3 G/DL (ref 31–37)
MCV RBC AUTO: 91.6 FL
MICROALBUMIN UR-MCNC: 0.7 MG/DL
MICROALBUMIN/CREAT 24H UR-RTO: 5.1 UG/MG (ref ?–30)
MONOCYTES # BLD AUTO: 0.38 X10(3) UL (ref 0.1–1)
MONOCYTES NFR BLD AUTO: 7.6 %
NEUTROPHILS # BLD AUTO: 2.84 X10 (3) UL (ref 1.5–7.7)
NEUTROPHILS # BLD AUTO: 2.84 X10(3) UL (ref 1.5–7.7)
NEUTROPHILS NFR BLD AUTO: 57.2 %
NONHDLC SERPL-MCNC: 111 MG/DL (ref ?–130)
OSMOLALITY SERPL CALC.SUM OF ELEC: 288 MOSM/KG (ref 275–295)
PATIENT FASTING Y/N/NP: YES
PATIENT FASTING Y/N/NP: YES
PLATELET # BLD AUTO: 257 10(3)UL (ref 150–450)
POTASSIUM SERPL-SCNC: 4.1 MMOL/L (ref 3.5–5.1)
RBC # BLD AUTO: 3.67 X10(6)UL
SODIUM SERPL-SCNC: 136 MMOL/L (ref 136–145)
TRIGL SERPL-MCNC: 86 MG/DL (ref 30–149)
TSI SER-ACNC: 3.63 MIU/ML (ref 0.36–3.74)
VLDLC SERPL CALC-MCNC: 17 MG/DL (ref 0–30)
WBC # BLD AUTO: 5 X10(3) UL (ref 4–11)

## 2021-03-11 PROCEDURE — 80053 COMPREHEN METABOLIC PANEL: CPT

## 2021-03-11 PROCEDURE — 80061 LIPID PANEL: CPT

## 2021-03-11 PROCEDURE — 83036 HEMOGLOBIN GLYCOSYLATED A1C: CPT

## 2021-03-11 PROCEDURE — 3061F NEG MICROALBUMINURIA REV: CPT | Performed by: INTERNAL MEDICINE

## 2021-03-11 PROCEDURE — 82043 UR ALBUMIN QUANTITATIVE: CPT

## 2021-03-11 PROCEDURE — 36415 COLL VENOUS BLD VENIPUNCTURE: CPT

## 2021-03-11 PROCEDURE — 84443 ASSAY THYROID STIM HORMONE: CPT

## 2021-03-11 PROCEDURE — 82570 ASSAY OF URINE CREATININE: CPT

## 2021-03-11 PROCEDURE — 85025 COMPLETE CBC W/AUTO DIFF WBC: CPT

## 2021-03-13 ENCOUNTER — OFFICE VISIT (OUTPATIENT)
Dept: INTERNAL MEDICINE CLINIC | Facility: CLINIC | Age: 71
End: 2021-03-13
Payer: MEDICAID

## 2021-03-13 VITALS
BODY MASS INDEX: 31.82 KG/M2 | WEIGHT: 191 LBS | HEART RATE: 76 BPM | HEIGHT: 65 IN | RESPIRATION RATE: 16 BRPM | SYSTOLIC BLOOD PRESSURE: 109 MMHG | DIASTOLIC BLOOD PRESSURE: 73 MMHG

## 2021-03-13 DIAGNOSIS — I10 ESSENTIAL HYPERTENSION: ICD-10-CM

## 2021-03-13 DIAGNOSIS — D64.9 ANEMIA, UNSPECIFIED TYPE: ICD-10-CM

## 2021-03-13 DIAGNOSIS — E11.9 DIET-CONTROLLED DIABETES MELLITUS (HCC): Primary | ICD-10-CM

## 2021-03-13 DIAGNOSIS — N18.31 STAGE 3A CHRONIC KIDNEY DISEASE (HCC): ICD-10-CM

## 2021-03-13 DIAGNOSIS — Z12.11 COLON CANCER SCREENING: ICD-10-CM

## 2021-03-13 PROCEDURE — 3078F DIAST BP <80 MM HG: CPT | Performed by: INTERNAL MEDICINE

## 2021-03-13 PROCEDURE — 99214 OFFICE O/P EST MOD 30 MIN: CPT | Performed by: INTERNAL MEDICINE

## 2021-03-13 PROCEDURE — 3074F SYST BP LT 130 MM HG: CPT | Performed by: INTERNAL MEDICINE

## 2021-03-13 PROCEDURE — 3008F BODY MASS INDEX DOCD: CPT | Performed by: INTERNAL MEDICINE

## 2021-03-13 NOTE — PROGRESS NOTES
HPI:    Patient ID: Ismael Jacobs is a 66year old female. HPI:    She is a little depressed because of COVID, but now she got the vaccines. She has gained weight.   She has discomfort of her hips when she gets up in the morning, but it goes away ana laura A Positive for arthralgias. ./73   Pulse 76   Resp 16   Ht 5' 5\" (1.651 m)   Wt 191 lb (86.6 kg)   BMI 31.78 kg/m²   PHYSICAL EXAM:   Physical Exam  Vitals and nursing note reviewed.    Constitutional:       General: She is not in acute distress

## 2021-03-22 DIAGNOSIS — I10 ESSENTIAL HYPERTENSION: ICD-10-CM

## 2021-03-22 RX ORDER — DILTIAZEM HYDROCHLORIDE 180 MG/1
180 CAPSULE, COATED, EXTENDED RELEASE ORAL DAILY
Qty: 90 CAPSULE | Refills: 1 | Status: SHIPPED | OUTPATIENT
Start: 2021-03-22 | End: 2021-06-15

## 2021-03-22 NOTE — TELEPHONE ENCOUNTER
Current Outpatient Medications   Medication Sig Dispense Refill   • dilTIAZem HCl ER Coated Beads 180 MG Oral Capsule SR 24 Hr Take 1 capsule (180 mg total) by mouth daily.  90 capsule 1

## 2021-03-25 DIAGNOSIS — I10 ESSENTIAL HYPERTENSION: ICD-10-CM

## 2021-03-25 RX ORDER — LOSARTAN POTASSIUM 100 MG/1
TABLET ORAL
Qty: 90 TABLET | Refills: 1 | Status: SHIPPED | OUTPATIENT
Start: 2021-03-25 | End: 2021-08-30

## 2021-03-26 ENCOUNTER — LAB ENCOUNTER (OUTPATIENT)
Dept: LAB | Age: 71
End: 2021-03-26
Attending: INTERNAL MEDICINE
Payer: MEDICAID

## 2021-03-26 PROCEDURE — 82274 ASSAY TEST FOR BLOOD FECAL: CPT

## 2021-03-30 RX ORDER — NORTRIPTYLINE HYDROCHLORIDE 10 MG/1
CAPSULE ORAL
Qty: 180 CAPSULE | Refills: 0 | Status: SHIPPED | OUTPATIENT
Start: 2021-03-30 | End: 2021-06-24

## 2021-05-14 ENCOUNTER — TELEPHONE (OUTPATIENT)
Dept: INTERNAL MEDICINE CLINIC | Facility: CLINIC | Age: 71
End: 2021-05-14

## 2021-05-14 DIAGNOSIS — Z12.31 ENCOUNTER FOR SCREENING MAMMOGRAM FOR MALIGNANT NEOPLASM OF BREAST: Primary | ICD-10-CM

## 2021-05-14 NOTE — TELEPHONE ENCOUNTER
Dr. Krystyna Alvarenga please advise patient is due for mammogram, order in chart . Patient was recently seen in office in March. No future appointments.

## 2021-05-26 DIAGNOSIS — R60.0 BILATERAL LOWER EXTREMITY EDEMA: ICD-10-CM

## 2021-05-26 RX ORDER — SPIRONOLACTONE AND HYDROCHLOROTHIAZIDE 25; 25 MG/1; MG/1
TABLET ORAL
Qty: 45 TABLET | Refills: 0 | Status: SHIPPED | OUTPATIENT
Start: 2021-05-26 | End: 2021-08-27

## 2021-06-15 DIAGNOSIS — I10 ESSENTIAL HYPERTENSION: ICD-10-CM

## 2021-06-15 RX ORDER — DILTIAZEM HYDROCHLORIDE 180 MG/1
CAPSULE, COATED, EXTENDED RELEASE ORAL
Qty: 90 CAPSULE | Refills: 1 | Status: SHIPPED | OUTPATIENT
Start: 2021-06-15 | End: 2021-09-08

## 2021-06-24 RX ORDER — NORTRIPTYLINE HYDROCHLORIDE 10 MG/1
CAPSULE ORAL
Qty: 180 CAPSULE | Refills: 0 | Status: SHIPPED | OUTPATIENT
Start: 2021-06-24 | End: 2021-08-30

## 2021-08-23 DIAGNOSIS — R60.0 BILATERAL LOWER EXTREMITY EDEMA: ICD-10-CM

## 2021-08-24 NOTE — TELEPHONE ENCOUNTER
Please call pt's son and ask him to schedule a follow-up appointment for the next available slot. After appt has been scheduled, please refill the medication for 90 days.

## 2021-08-27 RX ORDER — SPIRONOLACTONE AND HYDROCHLOROTHIAZIDE 25; 25 MG/1; MG/1
TABLET ORAL
Qty: 45 TABLET | Refills: 0 | Status: SHIPPED | OUTPATIENT
Start: 2021-08-27 | End: 2021-08-30

## 2021-08-30 ENCOUNTER — OFFICE VISIT (OUTPATIENT)
Dept: INTERNAL MEDICINE CLINIC | Facility: CLINIC | Age: 71
End: 2021-08-30
Payer: MEDICAID

## 2021-08-30 VITALS
HEIGHT: 65 IN | BODY MASS INDEX: 31.51 KG/M2 | WEIGHT: 189.13 LBS | RESPIRATION RATE: 18 BRPM | HEART RATE: 89 BPM | DIASTOLIC BLOOD PRESSURE: 76 MMHG | TEMPERATURE: 98 F | SYSTOLIC BLOOD PRESSURE: 126 MMHG

## 2021-08-30 DIAGNOSIS — E78.00 HYPERCHOLESTEROLEMIA: ICD-10-CM

## 2021-08-30 DIAGNOSIS — E11.9 DIET-CONTROLLED DIABETES MELLITUS (HCC): Primary | ICD-10-CM

## 2021-08-30 DIAGNOSIS — I10 ESSENTIAL HYPERTENSION: ICD-10-CM

## 2021-08-30 DIAGNOSIS — R60.0 BILATERAL LOWER EXTREMITY EDEMA: ICD-10-CM

## 2021-08-30 DIAGNOSIS — N18.32 STAGE 3B CHRONIC KIDNEY DISEASE (HCC): ICD-10-CM

## 2021-08-30 PROBLEM — N18.31 STAGE 3A CHRONIC KIDNEY DISEASE (HCC): Status: RESOLVED | Noted: 2020-08-20 | Resolved: 2021-08-30

## 2021-08-30 LAB — CARTRIDGE LOT#: ABNORMAL NUMERIC

## 2021-08-30 PROCEDURE — 83036 HEMOGLOBIN GLYCOSYLATED A1C: CPT | Performed by: INTERNAL MEDICINE

## 2021-08-30 PROCEDURE — 3008F BODY MASS INDEX DOCD: CPT | Performed by: INTERNAL MEDICINE

## 2021-08-30 PROCEDURE — 3078F DIAST BP <80 MM HG: CPT | Performed by: INTERNAL MEDICINE

## 2021-08-30 PROCEDURE — 3044F HG A1C LEVEL LT 7.0%: CPT | Performed by: INTERNAL MEDICINE

## 2021-08-30 PROCEDURE — 99214 OFFICE O/P EST MOD 30 MIN: CPT | Performed by: INTERNAL MEDICINE

## 2021-08-30 PROCEDURE — 3074F SYST BP LT 130 MM HG: CPT | Performed by: INTERNAL MEDICINE

## 2021-08-30 RX ORDER — AMLODIPINE BESYLATE 5 MG/1
5 TABLET ORAL DAILY
Qty: 90 TABLET | Refills: 1 | Status: SHIPPED | OUTPATIENT
Start: 2021-08-30 | End: 2021-11-28

## 2021-08-30 RX ORDER — NORTRIPTYLINE HYDROCHLORIDE 10 MG/1
20 CAPSULE ORAL DAILY
Qty: 180 CAPSULE | Refills: 1 | Status: SHIPPED | OUTPATIENT
Start: 2021-08-30 | End: 2021-09-24

## 2021-08-30 RX ORDER — LOSARTAN POTASSIUM 100 MG/1
TABLET ORAL
Qty: 90 TABLET | Refills: 1 | Status: SHIPPED | OUTPATIENT
Start: 2021-08-30 | End: 2021-12-20

## 2021-08-30 RX ORDER — ATORVASTATIN CALCIUM 10 MG/1
10 TABLET, FILM COATED ORAL DAILY
Qty: 90 TABLET | Refills: 1 | Status: SHIPPED | OUTPATIENT
Start: 2021-08-30 | End: 2021-11-28

## 2021-08-30 RX ORDER — SPIRONOLACTONE AND HYDROCHLOROTHIAZIDE 25; 25 MG/1; MG/1
0.5 TABLET ORAL DAILY
Qty: 45 TABLET | Refills: 1 | Status: SHIPPED | OUTPATIENT
Start: 2021-08-30 | End: 2021-11-28

## 2021-08-31 NOTE — PROGRESS NOTES
HPI:    Patient ID: Kit Momin is a 66year old female. HPI:  Pt c/o headache, neck pain in the morning. Then it improves and she is active.   She doesn't want to do the mammogram.  Patient has a lot of anxiety, however her son thinks she will feel be palpitations. Psychiatric/Behavioral: Negative for dysphoric mood. The patient is nervous/anxious.          ./76 (BP Location: Right arm, Patient Position: Sitting, Cuff Size: large)   Pulse 89   Temp 98.1 °F (36.7 °C) (Temporal)   Resp 18   Ht 5' 5 Sig: TAKE 1 TABLET BY MOUTH DAILY   • amLODIPine 5 MG Oral Tab 90 tablet 1     Sig: Take 1 tablet (5 mg total) by mouth daily. • atorvastatin 10 MG Oral Tab 90 tablet 1     Sig: Take 1 tablet (10 mg total) by mouth daily.    • nortriptyline 10 MG Oral Cap

## 2021-09-04 ENCOUNTER — LAB ENCOUNTER (OUTPATIENT)
Dept: LAB | Age: 71
End: 2021-09-04
Attending: INTERNAL MEDICINE
Payer: MEDICAID

## 2021-09-04 DIAGNOSIS — N18.32 STAGE 3B CHRONIC KIDNEY DISEASE (HCC): ICD-10-CM

## 2021-09-04 LAB
ALBUMIN SERPL-MCNC: 4 G/DL (ref 3.4–5)
ANION GAP SERPL CALC-SCNC: 7 MMOL/L (ref 0–18)
BUN BLD-MCNC: 26 MG/DL (ref 7–18)
BUN/CREAT SERPL: 19.8 (ref 10–20)
CALCIUM BLD-MCNC: 9.3 MG/DL (ref 8.5–10.1)
CHLORIDE SERPL-SCNC: 107 MMOL/L (ref 98–112)
CO2 SERPL-SCNC: 26 MMOL/L (ref 21–32)
CREAT BLD-MCNC: 1.31 MG/DL
GLUCOSE BLD-MCNC: 100 MG/DL (ref 70–99)
OSMOLALITY SERPL CALC.SUM OF ELEC: 295 MOSM/KG (ref 275–295)
PHOSPHATE SERPL-MCNC: 3.2 MG/DL (ref 2.5–4.9)
POTASSIUM SERPL-SCNC: 4.2 MMOL/L (ref 3.5–5.1)
SODIUM SERPL-SCNC: 140 MMOL/L (ref 136–145)

## 2021-09-04 PROCEDURE — 36415 COLL VENOUS BLD VENIPUNCTURE: CPT

## 2021-09-04 PROCEDURE — 80069 RENAL FUNCTION PANEL: CPT

## 2021-09-08 DIAGNOSIS — I10 ESSENTIAL HYPERTENSION: ICD-10-CM

## 2021-09-08 RX ORDER — DILTIAZEM HYDROCHLORIDE 180 MG/1
180 CAPSULE, COATED, EXTENDED RELEASE ORAL DAILY
Qty: 90 CAPSULE | Refills: 1 | Status: SHIPPED | OUTPATIENT
Start: 2021-09-08 | End: 2021-12-08

## 2021-09-08 RX ORDER — DILTIAZEM HYDROCHLORIDE 180 MG/1
CAPSULE, COATED, EXTENDED RELEASE ORAL
Qty: 90 CAPSULE | Refills: 1 | OUTPATIENT
Start: 2021-09-08

## 2021-09-08 NOTE — TELEPHONE ENCOUNTER
Please review. Protocol failed / No Protocol.     Requested Prescriptions   Pending Prescriptions Disp Refills    DILTIAZEM HCL ER COATED BEADS 180 MG Oral Capsule SR 24 Hr [Pharmacy Med Name: DILTIAZEM CD 180MG CAPSULES (24 HR)] 90 capsule 1     Sig: TAKE

## 2021-09-24 RX ORDER — NORTRIPTYLINE HYDROCHLORIDE 10 MG/1
20 CAPSULE ORAL DAILY
Qty: 180 CAPSULE | Refills: 1 | Status: SHIPPED | OUTPATIENT
Start: 2021-09-24 | End: 2022-09-23

## 2021-09-24 NOTE — TELEPHONE ENCOUNTER
[facility].     Requested Prescriptions   Pending Prescriptions Disp Refills    NORTRIPTYLINE 10 MG Oral Cap [Pharmacy Med Name: NORTRIPTYLINE 10MG CAPSULES] 180 capsule 1     Sig: TAKE 2 CAPSULES BY MOUTH EVERY DAY        Psychiatric Non-Scheduled (Anti-Anxiety) Passed - 9/24/2021  1:23 PM        Passed - Appointment in last 6 or next 3 months              Future Appointments         Provider Department Appt Notes    In 1 month Corrine Mosquera MD [facility] ep dm ee            Recent Outpatient Visits              3 weeks ago Diet-controlled diabetes mellitus [facility])    [facility], [address] [place] [address], Ezequiel Heredia MD    Office Visit    6 months ago Diet-controlled diabetes mellitus [facility])    Katy Rubalcava MD    Office Visit    1 year ago Essential hypertension    [facility] [place], [place], Kit Anaya MD    Telemedicine    1 year ago Glaucoma suspect of both eyes    [facility]    Nurse Only    1 year ago Vaginal discomfort    Sarath Keating, Ezequiel Heredia MD    Office Visit

## 2021-10-28 ENCOUNTER — OFFICE VISIT (OUTPATIENT)
Dept: OPHTHALMOLOGY | Facility: CLINIC | Age: 71
End: 2021-10-28
Payer: MEDICAID

## 2021-10-28 DIAGNOSIS — H26.8: ICD-10-CM

## 2021-10-28 DIAGNOSIS — E11.9 DIET-CONTROLLED DIABETES MELLITUS (HCC): ICD-10-CM

## 2021-10-28 DIAGNOSIS — H40.003 GLAUCOMA SUSPECT OF BOTH EYES: Primary | ICD-10-CM

## 2021-10-28 DIAGNOSIS — H25.13 AGE-RELATED NUCLEAR CATARACT OF BOTH EYES: ICD-10-CM

## 2021-10-28 DIAGNOSIS — H43.393 FLOATER, VITREOUS, BILATERAL: ICD-10-CM

## 2021-10-28 PROCEDURE — 92250 FUNDUS PHOTOGRAPHY W/I&R: CPT | Performed by: OPHTHALMOLOGY

## 2021-10-28 PROCEDURE — 92014 COMPRE OPH EXAM EST PT 1/>: CPT | Performed by: OPHTHALMOLOGY

## 2021-10-28 PROCEDURE — 92015 DETERMINE REFRACTIVE STATE: CPT | Performed by: OPHTHALMOLOGY

## 2021-10-28 NOTE — PATIENT INSTRUCTIONS
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 Retinal photos taken today to document optic nerves.   Glaucoma di

## 2021-10-28 NOTE — PROGRESS NOTES
Hoda Fuentes is a 66year old female.     HPI:     HPI     Consult     Comments: Per Dr Lundy Schilder               Diabetic Eye Exam     Comments: Pt has been a diabetic for 1 year (borderline previously)  Pt's diabetes is currently controlled by diet   Pt check total) by mouth daily. 90 tablet 1   • Spironolactone-HCTZ 25-25 MG Oral Tab Take 0.5 tablets by mouth daily. 45 tablet 1   • amLODIPine 5 MG Oral Tab Take 1 tablet (5 mg total) by mouth daily.  90 tablet 1       Allergies:    Lisinopril              Coughi Normal- no BDR    Vessels Normal Normal    Periphery Normal Normal            Refraction     Wearing Rx       Sphere Cylinder Axis Add    Right +0.75 +0.75 160 +3.00    Left +0.25 +1.25 180 +3.00    Age: 2yrs    Type: Progressive bifocal          Manifest avail. VF and OCT with no MD, If glaucoma diagnostics are wnl, then 1 yr DM EE.    10/28/2021  Scribed by: Daly Ojeda MD

## 2021-10-28 NOTE — ASSESSMENT & PLAN NOTE
Discussed moderate cataracts in both eyes that are affecting vision, but no surgery is needed at this time. New glasses today; suggest update.

## 2021-10-28 NOTE — ASSESSMENT & PLAN NOTE
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 Retinal photos taken today to document optic nerves. Glaucoma diagnostic testing ordered.   Will

## 2021-11-23 ENCOUNTER — TELEPHONE (OUTPATIENT)
Dept: OPHTHALMOLOGY | Facility: CLINIC | Age: 71
End: 2021-11-23

## 2021-11-23 ENCOUNTER — NURSE ONLY (OUTPATIENT)
Dept: OPHTHALMOLOGY | Facility: CLINIC | Age: 71
End: 2021-11-23
Payer: MEDICAID

## 2021-11-23 DIAGNOSIS — H40.003 GLAUCOMA SUSPECT OF BOTH EYES: ICD-10-CM

## 2021-11-23 PROCEDURE — 92133 CPTRZD OPH DX IMG PST SGM ON: CPT | Performed by: OPHTHALMOLOGY

## 2021-11-23 PROCEDURE — 92083 EXTENDED VISUAL FIELD XM: CPT | Performed by: OPHTHALMOLOGY

## 2021-11-23 NOTE — PROGRESS NOTES
Nghia Schulz is a 66year old female.     HPI:     HPI     Here for a VF and OCT no MD.     Last edited by Haim Devries O.T. on 11/23/2021 11:13 AM. (History)        Patient History:  Past Medical History:   Diagnosis Date   • Essential hypertension defined types were placed in this encounter.       Meds This Visit:  Requested Prescriptions      No prescriptions requested or ordered in this encounter        Follow up instructions:  Return in about 11 months (around 10/23/2022) for Diabetic dilated eye

## 2021-11-27 DIAGNOSIS — I10 ESSENTIAL HYPERTENSION: ICD-10-CM

## 2021-11-27 DIAGNOSIS — R60.0 BILATERAL LOWER EXTREMITY EDEMA: ICD-10-CM

## 2021-11-27 DIAGNOSIS — E78.00 HYPERCHOLESTEROLEMIA: ICD-10-CM

## 2021-11-28 RX ORDER — AMLODIPINE BESYLATE 5 MG/1
5 TABLET ORAL DAILY
Qty: 90 TABLET | Refills: 0 | Status: SHIPPED | OUTPATIENT
Start: 2021-11-28 | End: 2021-12-20

## 2021-11-28 RX ORDER — SPIRONOLACTONE AND HYDROCHLOROTHIAZIDE 25; 25 MG/1; MG/1
0.5 TABLET ORAL DAILY
Qty: 45 TABLET | Refills: 0 | Status: SHIPPED | OUTPATIENT
Start: 2021-11-28 | End: 2021-12-20

## 2021-11-28 RX ORDER — ATORVASTATIN CALCIUM 10 MG/1
10 TABLET, FILM COATED ORAL DAILY
Qty: 90 TABLET | Refills: 0 | Status: SHIPPED | OUTPATIENT
Start: 2021-11-28 | End: 2021-12-20

## 2021-12-07 DIAGNOSIS — I10 ESSENTIAL HYPERTENSION: ICD-10-CM

## 2021-12-11 RX ORDER — DILTIAZEM HYDROCHLORIDE 180 MG/1
180 CAPSULE, COATED, EXTENDED RELEASE ORAL DAILY
Qty: 90 CAPSULE | Refills: 0 | Status: SHIPPED | OUTPATIENT
Start: 2021-12-11 | End: 2021-12-20

## 2021-12-20 ENCOUNTER — OFFICE VISIT (OUTPATIENT)
Dept: INTERNAL MEDICINE CLINIC | Facility: CLINIC | Age: 71
End: 2021-12-20
Payer: MEDICAID

## 2021-12-20 VITALS
HEART RATE: 82 BPM | SYSTOLIC BLOOD PRESSURE: 121 MMHG | WEIGHT: 194.5 LBS | HEIGHT: 65 IN | BODY MASS INDEX: 32.4 KG/M2 | DIASTOLIC BLOOD PRESSURE: 69 MMHG | RESPIRATION RATE: 18 BRPM

## 2021-12-20 DIAGNOSIS — G45.9 TIA (TRANSIENT ISCHEMIC ATTACK): Primary | ICD-10-CM

## 2021-12-20 DIAGNOSIS — N18.32 STAGE 3B CHRONIC KIDNEY DISEASE (HCC): ICD-10-CM

## 2021-12-20 DIAGNOSIS — E78.00 HYPERCHOLESTEROLEMIA: ICD-10-CM

## 2021-12-20 DIAGNOSIS — E11.9 DIET-CONTROLLED DIABETES MELLITUS (HCC): ICD-10-CM

## 2021-12-20 DIAGNOSIS — R55 SYNCOPE, UNSPECIFIED SYNCOPE TYPE: ICD-10-CM

## 2021-12-20 DIAGNOSIS — I10 ESSENTIAL HYPERTENSION: ICD-10-CM

## 2021-12-20 DIAGNOSIS — R60.0 BILATERAL LOWER EXTREMITY EDEMA: ICD-10-CM

## 2021-12-20 DIAGNOSIS — F41.9 ANXIETY: ICD-10-CM

## 2021-12-20 PROBLEM — E11.22 TYPE 2 DIABETES MELLITUS WITH DIABETIC CHRONIC KIDNEY DISEASE (HCC): Status: ACTIVE | Noted: 2021-12-20

## 2021-12-20 PROBLEM — N94.9 VAGINAL DISCOMFORT: Status: RESOLVED | Noted: 2020-03-16 | Resolved: 2021-12-20

## 2021-12-20 PROCEDURE — 3074F SYST BP LT 130 MM HG: CPT | Performed by: INTERNAL MEDICINE

## 2021-12-20 PROCEDURE — 3078F DIAST BP <80 MM HG: CPT | Performed by: INTERNAL MEDICINE

## 2021-12-20 PROCEDURE — 99215 OFFICE O/P EST HI 40 MIN: CPT | Performed by: INTERNAL MEDICINE

## 2021-12-20 PROCEDURE — 3008F BODY MASS INDEX DOCD: CPT | Performed by: INTERNAL MEDICINE

## 2021-12-20 RX ORDER — DILTIAZEM HYDROCHLORIDE 180 MG/1
180 CAPSULE, COATED, EXTENDED RELEASE ORAL DAILY
Qty: 90 CAPSULE | Refills: 1 | Status: SHIPPED | OUTPATIENT
Start: 2021-12-20

## 2021-12-20 RX ORDER — SPIRONOLACTONE AND HYDROCHLOROTHIAZIDE 25; 25 MG/1; MG/1
0.5 TABLET ORAL DAILY
Qty: 45 TABLET | Refills: 1 | Status: SHIPPED | OUTPATIENT
Start: 2021-12-20

## 2021-12-20 RX ORDER — ATORVASTATIN CALCIUM 10 MG/1
10 TABLET, FILM COATED ORAL DAILY
Qty: 90 TABLET | Refills: 1 | Status: SHIPPED | OUTPATIENT
Start: 2021-12-20

## 2021-12-20 RX ORDER — LOSARTAN POTASSIUM 100 MG/1
TABLET ORAL
Qty: 90 TABLET | Refills: 1 | Status: SHIPPED | OUTPATIENT
Start: 2021-12-20

## 2021-12-20 RX ORDER — ASPIRIN 325 MG
325 TABLET ORAL DAILY
Qty: 30 TABLET | Refills: 11 | OUTPATIENT
Start: 2021-12-20

## 2021-12-20 RX ORDER — ALPRAZOLAM 0.25 MG/1
TABLET ORAL
Qty: 15 TABLET | Refills: 0 | Status: SHIPPED | OUTPATIENT
Start: 2021-12-20

## 2021-12-21 ENCOUNTER — LAB ENCOUNTER (OUTPATIENT)
Dept: LAB | Age: 71
End: 2021-12-21
Attending: INTERNAL MEDICINE
Payer: MEDICAID

## 2021-12-21 DIAGNOSIS — G45.9 TIA (TRANSIENT ISCHEMIC ATTACK): ICD-10-CM

## 2021-12-21 PROCEDURE — 93010 ELECTROCARDIOGRAM REPORT: CPT | Performed by: INTERNAL MEDICINE

## 2021-12-21 PROCEDURE — 93005 ELECTROCARDIOGRAM TRACING: CPT

## 2021-12-21 NOTE — ASSESSMENT & PLAN NOTE
Patient fell and briefly lost consciousness after urinating. This may have been vasovagal, however patient also had difficulty speaking for a few minutes. We will check labs, EKG, and MRI brain.

## 2021-12-21 NOTE — PROGRESS NOTES
HPI:    Patient ID: Jcarlos Covington is a 66year old female. HPI:   The nortriptyline is helping the neck pain. She had an episode of dizziness and she went to the bathroom. She fell and had a speech difficulty which lasted a minute or so.  No residual s Review of Systems   Respiratory: Negative for cough and shortness of breath. Cardiovascular: Negative for chest pain and palpitations. Neurological: Positive for dizziness, syncope and speech difficulty.  Negative for facial asymmetry and numbnes however patient also had difficulty speaking for a few minutes. We will check labs, EKG, and MRI brain. Relevant Orders    MRI BRAIN (W+WO) (CPT=70553)    URINALYSIS, ROUTINE       Medium    Diet-controlled diabetes mellitus ([facility] [address]     Controlled.  effect. Total time spent caring for the patient on the day of the encounter:  40 min  This includes pre-charting, reviewing and obtaining results, exam, plan, notes, and counseling.

## 2021-12-21 NOTE — PATIENT INSTRUCTIONS
Do fasting labs soon. Fast for 12 hours. Water is okay. You should schedule an appointment at [email or web address]  Do not take vitamins or supplements for 3 days prior to the blood test.    Go to [employer] to find the closest location.

## 2021-12-21 NOTE — ASSESSMENT & PLAN NOTE
Patient likely had a TIA. We will start aspirin 325 mg daily. Advised son to take mother to hospital if she has another episode. Check MRI of brain.

## 2022-01-13 ENCOUNTER — HOSPITAL ENCOUNTER (OUTPATIENT)
Dept: MRI IMAGING | Age: 72
Discharge: HOME | End: 2022-01-13
Attending: INTERNAL MEDICINE
Payer: MEDICAID

## 2022-01-13 DIAGNOSIS — R55 SYNCOPE, UNSPECIFIED SYNCOPE TYPE: ICD-10-CM

## 2022-01-13 DIAGNOSIS — G45.9 TIA (TRANSIENT ISCHEMIC ATTACK): ICD-10-CM

## 2022-01-13 LAB — CREAT BLD-MCNC: 1.7 MG/DL

## 2022-01-13 PROCEDURE — 82565 ASSAY OF CREATININE: CPT

## 2022-01-13 PROCEDURE — A9575 INJ GADOTERATE MEGLUMI 0.1ML: HCPCS | Performed by: INTERNAL MEDICINE

## 2022-01-13 PROCEDURE — 70553 MRI BRAIN STEM W/O & W/DYE: CPT | Performed by: INTERNAL MEDICINE

## 2022-01-24 ENCOUNTER — MED REC SCAN ONLY (OUTPATIENT)
Dept: INTERNAL MEDICINE CLINIC | Facility: CLINIC | Age: 72
End: 2022-01-24

## 2022-03-08 ENCOUNTER — TELEPHONE (OUTPATIENT)
Dept: INTERNAL MEDICINE CLINIC | Facility: CLINIC | Age: 72
End: 2022-03-08

## 2022-03-08 DIAGNOSIS — Z12.11 COLON CANCER SCREENING: Primary | ICD-10-CM

## 2022-08-22 DIAGNOSIS — E78.00 HYPERCHOLESTEROLEMIA: ICD-10-CM

## 2022-08-22 RX ORDER — ATORVASTATIN CALCIUM 10 MG/1
TABLET, FILM COATED ORAL
Qty: 90 TABLET | Refills: 1 | Status: SHIPPED | OUTPATIENT
Start: 2022-08-22

## 2022-08-29 DIAGNOSIS — I10 ESSENTIAL HYPERTENSION: ICD-10-CM

## 2022-08-30 NOTE — TELEPHONE ENCOUNTER
Please review. Protocol Failed or has no Protocol. Requested Prescriptions   Pending Prescriptions Disp Refills    LOSARTAN 100 MG Oral Tab [Pharmacy Med Name: LOSARTAN 100MG TABLETS] 90 tablet 1     Sig: TAKE 1 TABLET BY MOUTH DAILY        Hypertensive Medications Protocol Failed - 8/29/2022  2:17 PM        Failed - CMP or BMP in past 6 months     No results found for this or any previous visit (from the past 4392 hour(s)).               Failed - In person appointment or virtual visit in the past 6 months       Recent Outpatient Visits              8 months ago TIA (transient ischemic attack)    [facility],  Nela Tsai MD    Office Visit    9 months ago Glaucoma suspect of both eyes    [place] [facility]    Nurse Only    10 months ago Glaucoma suspect of both eyes    [place] [facility] Tatiana Whitney MD    Office Visit    1 year ago Diet-controlled diabetes mellitus [facility])    Mele Urbina MD    Office Visit    1 year ago Diet-controlled diabetes mellitus [facility])    Mele Urbina MD    Office Visit                 Failed - GFR > 50     No results found for: [facility]              Passed - In person appointment in the past 12 or next 3 months       Recent Outpatient Visits              8 months ago TIA (transient ischemic attack)    [facility],  Nela Tsai MD    Office Visit    9 months ago Glaucoma suspect of both eyes    [place] [facility]    Nurse Only    10 months ago Glaucoma suspect of both eyes    [place] [facility] Tatiana Whitney MD    Office Visit    1 year ago Diet-controlled diabetes mellitus [facility])    Mele Urbina MD    Office Visit    1 year ago Diet-controlled diabetes mellitus [facility])    [facility] [facility], [address] [place] [address], Charley Julian MD    Office Visit                 Passed - Last BP reading less than 140/90     BP Readings from Last 1 Encounters:  12/20/21 : 121/69                     Recent Outpatient Visits              8 months ago TIA (transient ischemic attack)    [facility], [address] [place] [address], Charley Julian MD    Office Visit    9 months ago Glaucoma suspect of both eyes    [facility]    Nurse Only    10 months ago Glaucoma suspect of both eyes    [facility] Dina Coleman MD    Office Visit    1 year ago Diet-controlled diabetes mellitus [facility])    Vinnie Encarnacion MD    Office Visit    1 year ago Diet-controlled diabetes mellitus [facility])    Vinnie Encarnacion MD    Office Visit

## 2022-08-31 RX ORDER — LOSARTAN POTASSIUM 100 MG/1
TABLET ORAL
Qty: 90 TABLET | Refills: 0 | Status: SHIPPED | OUTPATIENT
Start: 2022-08-31 | End: 2022-11-28

## 2022-09-23 RX ORDER — NORTRIPTYLINE HYDROCHLORIDE 10 MG/1
20 CAPSULE ORAL DAILY
Qty: 180 CAPSULE | Refills: 0 | Status: SHIPPED | OUTPATIENT
Start: 2022-09-23

## 2022-10-03 ENCOUNTER — TELEPHONE (OUTPATIENT)
Dept: INTERNAL MEDICINE CLINIC | Facility: CLINIC | Age: 72
End: 2022-10-03

## 2022-10-03 ENCOUNTER — PATIENT MESSAGE (OUTPATIENT)
Dept: INTERNAL MEDICINE CLINIC | Facility: CLINIC | Age: 72
End: 2022-10-03

## 2022-10-03 NOTE — TELEPHONE ENCOUNTER
----- Message from Sarah Stephens RN sent at 10/3/2022  1:08 PM CDT -----  Regarding: FW: Vertigo      ----- Message -----  From: Kierra Fisher  Sent: 10/3/2022  11:56 AM CDT  To: Em Rn Triage  Subject: Vertigo                                          Hi Dr. Toño Solomon   My mom is having very bad vertigo  today. It started in Saturday and she stayed in the bed all day. She got better on Sunday, but here it comes again today. She is having hard time changing positions. She is taking advil alternated with excedrin to no much help. Is there anything else that might help her?   Please advise     Thank you in advance   [facility]

## 2022-10-03 NOTE — TELEPHONE ENCOUNTER
From: Jose Antonio Alarcon  To: Hyacinth Quach MD  Sent: 10/3/2022 11:56 AM CDT  Subject: Vertigo    Hi Dr. Echo Paez   My mom is having very bad vertigo today. It started in Saturday and she stayed in the bed all day. She got better on Sunday, but here it comes again today. She is having hard time changing positions. She is taking advil alternated with excedrin to no much help. Is there anything else that might help her?   Please advise     Thank you in advance   [facility]

## 2022-10-03 NOTE — TELEPHONE ENCOUNTER
With [facility]  [facility] #[id number], patient's/son # voicemail not set up yet. Left message on Beatriz's number to have patient or Imtiaz call back-transfer to triage. [employer] message sent.

## 2022-10-04 NOTE — TELEPHONE ENCOUNTER
Patient contacted via [employer]  Bonny Arias ID #[id number], unable to leave message as voicemail not set up

## 2022-11-21 DIAGNOSIS — I10 ESSENTIAL HYPERTENSION: ICD-10-CM

## 2022-11-21 NOTE — TELEPHONE ENCOUNTER
Please review. Protocol failed / No protocol. Requested Prescriptions   Pending Prescriptions Disp Refills    DILTIAZEM HCL ER COATED BEADS 180 MG Oral Capsule SR 24 Hr [Pharmacy Med Name: DILTIAZEM CD 180MG CAPSULES (24 HR)] 90 capsule 1     Sig: TAKE 1 CAPSULE(180 MG) BY MOUTH DAILY       Hypertensive Medications Protocol Failed - 11/21/2022  1:22 PM        Failed - CMP or BMP in past 6 months     No results found for this or any previous visit (from the past 4392 hour(s)).             Failed - In person appointment or virtual visit in the past 6 months     Recent Outpatient Visits              11 months ago TIA (transient ischemic attack)    [facility], [address] [place], [place] [address]Woodrow MD    Office Visit    12 months ago Glaucoma suspect of both eyes    [facility]    Nurse Only    1 year ago Glaucoma suspect of both eyes    [facility] Yane Rainey MD    Office Visit    1 year ago Diet-controlled diabetes mellitus [facility])    Franki Patel MD    Office Visit    1 year ago Diet-controlled diabetes mellitus [facility])    Franki Patel MD    Office Visit          Future Appointments         Provider Department Appt Notes    In 1 month Jackelyn Carrasco MD [address] Annual check up               Failed - [facility] or GFRNAA > 50     GFR Evaluation  GFRNAA: 29 , resulted on 1/13/2022          Passed - In person appointment in the past 12 or next 3 months     Recent Outpatient Visits              11 months ago TIA (transient ischemic attack)    [address]    Office Visit    12 months ago Glaucoma suspect of both eyes    [facility]    Nurse Only    1 year ago Glaucoma suspect of both eyes    [facility] Yane Rainey MD Office Visit    1 year ago Diet-controlled diabetes mellitus [facility])    Lisa Ley MD    Office Visit    1 year ago Diet-controlled diabetes mellitus [facility])    Lisa Ley MD    Office Visit          Future Appointments         Provider Department Appt Notes    In 1 month Clifford Lewis MD [facility], [address], [place] Annual check up               Passed - Last BP reading less than 140/90     BP Readings from Last 1 Encounters:  12/20/21 : 121/69                   Recent Outpatient Visits              11 months ago TIA (transient ischemic attack)    [facility], [address] [place] [address] Brenda Vivas MD    Office Visit    12 months ago Glaucoma suspect of both eyes    [facility]    Nurse Only    1 year ago Glaucoma suspect of both eyes    [facility] Mari Ortega MD    Office Visit    1 year ago Diet-controlled diabetes mellitus [facility])    Lisa Ley MD    Office Visit    1 year ago Diet-controlled diabetes mellitus [facility])    Lisa Ley MD    Office Visit            Future Appointments         Provider Department Appt Notes    In 1 month Clifford Lewis MD [facility], [address] [place] [address] check up

## 2022-11-22 RX ORDER — DILTIAZEM HYDROCHLORIDE 180 MG/1
180 CAPSULE, COATED, EXTENDED RELEASE ORAL DAILY
Qty: 90 CAPSULE | Refills: 0 | Status: SHIPPED | OUTPATIENT
Start: 2022-11-22

## 2022-11-27 DIAGNOSIS — I10 ESSENTIAL HYPERTENSION: ICD-10-CM

## 2022-11-28 RX ORDER — LOSARTAN POTASSIUM 100 MG/1
TABLET ORAL
Qty: 90 TABLET | Refills: 0 | Status: SHIPPED | OUTPATIENT
Start: 2022-11-28

## 2022-11-28 NOTE — TELEPHONE ENCOUNTER
Please review refill failed/no protocol     Requested Prescriptions     Pending Prescriptions Disp Refills    LOSARTAN 100 MG Oral Tab [Pharmacy Med Name: LOSARTAN 100MG TABLETS] 90 tablet 0     Sig: TAKE 1 TABLET BY MOUTH DAILY         Recent Visits  Date Type Provider Dept   12/20/21 Office Visit Betsy Geronimo MD [facility]   08/30/21 Office Visit Betsy Geronimo MD [facility]   Showing recent visits within past 540 days with a meds authorizing provider and meeting all other requirements  Future Appointments  Date Type Provider Dept   01/19/23 Appointment Betsy Geronimo MD [facility]   Showing future appointments within next 150 days with a meds authorizing provider and meeting all other requirements    Requested Prescriptions   Pending Prescriptions Disp Refills    LOSARTAN 100 MG Oral Tab [Pharmacy Med Name: LOSARTAN 100MG TABLETS] 90 tablet 0     Sig: TAKE 1 TABLET BY MOUTH DAILY       Hypertensive Medications Protocol Failed - 11/27/2022  4:46 PM        Failed - CMP or BMP in past 6 months     No results found for this or any previous visit (from the past 4392 hour(s)).             Failed - In person appointment or virtual visit in the past 6 months     Recent Outpatient Visits              11 months ago TIA (transient ischemic attack)    [address] [place] [address] [place], [address] [place] [address], Kierra Mercedes MD    Office Visit    1 year ago Glaucoma suspect of both eyes    [facility]    Nurse Only    1 year ago Glaucoma suspect of both eyes    [facility] Vanessa Zimmerman MD    Office Visit    1 year ago Diet-controlled diabetes mellitus [facility])    Maria E Cortez MD    Office Visit    1 year ago Diet-controlled diabetes mellitus [facility])    Maria E Cortez MD    Office Visit          Future Appointments         Provider Department Appt Notes    In 1 month Betsy Geronimo MD [address], [address] [place] [address] Annual check up               Failed - [facility] or [facility] > 50     GFR Evaluation  GFRNAA: 29 , resulted on 1/13/2022          Passed - In person appointment in the past 12 or next 3 months     Recent Outpatient Visits              11 months ago TIA (transient ischemic attack)    [address] [place], [place] [address]Kierra MD    Office Visit    1 year ago Glaucoma suspect of both eyes    [facility]    Nurse Only    1 year ago Glaucoma suspect of both eyes    [facility] Vanessa Zimmerman MD    Office Visit    1 year ago Diet-controlled diabetes mellitus [facility])    Maria E Cortez MD    Office Visit    1 year ago Diet-controlled diabetes mellitus [facility])    Maria E Cortez MD    Office Visit          Future Appointments         Provider Department Appt Notes    In 1 month Betsy Geronimo MD [address], [address] [place] [address] Annual check up               Passed - Last BP reading less than 140/90     BP Readings from Last 1 Encounters:  12/20/21 : 121/69

## 2022-12-20 RX ORDER — NORTRIPTYLINE HYDROCHLORIDE 10 MG/1
20 CAPSULE ORAL DAILY
Qty: 180 CAPSULE | Refills: 0 | Status: SHIPPED | OUTPATIENT
Start: 2022-12-20

## 2022-12-20 NOTE — TELEPHONE ENCOUNTER
Please review refill failed/no protocol     Requested Prescriptions     Pending Prescriptions Disp Refills    NORTRIPTYLINE 10 MG Oral Cap [Pharmacy Med Name: NORTRIPTYLINE 10MG CAPSULES] 180 capsule 0     Sig: TAKE 2 CAPSULES(20 MG) BY MOUTH DAILY         Recent Visits  Date Type Provider Dept   12/20/21 Office Visit [facility]   08/30/21 Office [facility]   Showing recent visits within past 540 days with a meds authorizing provider and meeting all other requirements  Future Appointments  Date Type Provider Dept   01/19/23 Appointment Jihan Thomson MD [facility]   Showing future appointments within next 150 days with a meds authorizing provider and meeting all other requirements    Requested Prescriptions   Pending Prescriptions Disp Refills    NORTRIPTYLINE 10 MG Oral Cap [Pharmacy Med Name: NORTRIPTYLINE 10MG CAPSULES] 180 capsule 0     Sig: TAKE 2 CAPSULES(20 MG) BY MOUTH DAILY       There is no refill protocol information for this order

## 2023-01-19 ENCOUNTER — OFFICE VISIT (OUTPATIENT)
Dept: INTERNAL MEDICINE CLINIC | Facility: CLINIC | Age: 73
End: 2023-01-19

## 2023-01-19 ENCOUNTER — LAB ENCOUNTER (OUTPATIENT)
Dept: LAB | Age: 73
End: 2023-01-19
Attending: INTERNAL MEDICINE
Payer: MEDICAID

## 2023-01-19 VITALS
RESPIRATION RATE: 18 BRPM | WEIGHT: 198 LBS | BODY MASS INDEX: 32.99 KG/M2 | DIASTOLIC BLOOD PRESSURE: 77 MMHG | HEART RATE: 81 BPM | SYSTOLIC BLOOD PRESSURE: 137 MMHG | HEIGHT: 65 IN | OXYGEN SATURATION: 96 %

## 2023-01-19 DIAGNOSIS — I10 ESSENTIAL HYPERTENSION: ICD-10-CM

## 2023-01-19 DIAGNOSIS — Z00.00 ROUTINE HEALTH MAINTENANCE: Primary | ICD-10-CM

## 2023-01-19 DIAGNOSIS — E11.22 TYPE 2 DIABETES MELLITUS WITH STAGE 3B CHRONIC KIDNEY DISEASE, WITHOUT LONG-TERM CURRENT USE OF INSULIN (HCC): ICD-10-CM

## 2023-01-19 DIAGNOSIS — F41.9 ANXIETY: ICD-10-CM

## 2023-01-19 DIAGNOSIS — R60.0 BILATERAL LOWER EXTREMITY EDEMA: ICD-10-CM

## 2023-01-19 DIAGNOSIS — E78.00 HYPERCHOLESTEROLEMIA: ICD-10-CM

## 2023-01-19 DIAGNOSIS — N18.32 TYPE 2 DIABETES MELLITUS WITH STAGE 3B CHRONIC KIDNEY DISEASE, WITHOUT LONG-TERM CURRENT USE OF INSULIN (HCC): ICD-10-CM

## 2023-01-19 LAB
ALBUMIN SERPL-MCNC: 3.8 G/DL (ref 3.4–5)
ALBUMIN/GLOB SERPL: 1 {RATIO} (ref 1–2)
ALP LIVER SERPL-CCNC: 87 U/L
ALT SERPL-CCNC: 26 U/L
ANION GAP SERPL CALC-SCNC: 7 MMOL/L (ref 0–18)
AST SERPL-CCNC: 20 U/L (ref 15–37)
BASOPHILS # BLD AUTO: 0.05 X10(3) UL (ref 0–0.2)
BASOPHILS NFR BLD AUTO: 1 %
BILIRUB SERPL-MCNC: 0.6 MG/DL (ref 0.1–2)
BUN BLD-MCNC: 18 MG/DL (ref 7–18)
BUN/CREAT SERPL: 14.4 (ref 10–20)
CALCIUM BLD-MCNC: 10 MG/DL (ref 8.5–10.1)
CARTRIDGE LOT#: 535 NUMERIC
CHLORIDE SERPL-SCNC: 106 MMOL/L (ref 98–112)
CHOLEST SERPL-MCNC: 182 MG/DL (ref ?–200)
CO2 SERPL-SCNC: 28 MMOL/L (ref 21–32)
CREAT BLD-MCNC: 1.25 MG/DL
CREAT UR-SCNC: 54.1 MG/DL
DEPRECATED RDW RBC AUTO: 42.2 FL (ref 35.1–46.3)
EOSINOPHIL # BLD AUTO: 0.15 X10(3) UL (ref 0–0.7)
EOSINOPHIL NFR BLD AUTO: 2.9 %
ERYTHROCYTE [DISTWIDTH] IN BLOOD BY AUTOMATED COUNT: 12.7 % (ref 11–15)
FASTING PATIENT LIPID ANSWER: YES
FASTING STATUS PATIENT QL REPORTED: YES
GFR SERPLBLD BASED ON 1.73 SQ M-ARVRAT: 45 ML/MIN/1.73M2 (ref 60–?)
GLOBULIN PLAS-MCNC: 3.9 G/DL (ref 2.8–4.4)
GLUCOSE BLD-MCNC: 110 MG/DL (ref 70–99)
HCT VFR BLD AUTO: 34.6 %
HDLC SERPL-MCNC: 72 MG/DL (ref 40–59)
HEMOGLOBIN A1C: 6.2 % (ref 4.3–5.6)
HGB BLD-MCNC: 11.4 G/DL
IMM GRANULOCYTES # BLD AUTO: 0.01 X10(3) UL (ref 0–1)
IMM GRANULOCYTES NFR BLD: 0.2 %
LDLC SERPL CALC-MCNC: 95 MG/DL (ref ?–100)
LYMPHOCYTES # BLD AUTO: 1.49 X10(3) UL (ref 1–4)
LYMPHOCYTES NFR BLD AUTO: 28.5 %
MCH RBC QN AUTO: 30.1 PG (ref 26–34)
MCHC RBC AUTO-ENTMCNC: 32.9 G/DL (ref 31–37)
MCV RBC AUTO: 91.3 FL
MICROALBUMIN UR-MCNC: 5.03 MG/DL
MICROALBUMIN/CREAT 24H UR-RTO: 93 UG/MG (ref ?–30)
MONOCYTES # BLD AUTO: 0.4 X10(3) UL (ref 0.1–1)
MONOCYTES NFR BLD AUTO: 7.6 %
NEUTROPHILS # BLD AUTO: 3.13 X10 (3) UL (ref 1.5–7.7)
NEUTROPHILS # BLD AUTO: 3.13 X10(3) UL (ref 1.5–7.7)
NEUTROPHILS NFR BLD AUTO: 59.8 %
NONHDLC SERPL-MCNC: 110 MG/DL (ref ?–130)
OSMOLALITY SERPL CALC.SUM OF ELEC: 295 MOSM/KG (ref 275–295)
PLATELET # BLD AUTO: 247 10(3)UL (ref 150–450)
POTASSIUM SERPL-SCNC: 3.8 MMOL/L (ref 3.5–5.1)
PROT SERPL-MCNC: 7.7 G/DL (ref 6.4–8.2)
RBC # BLD AUTO: 3.79 X10(6)UL
SODIUM SERPL-SCNC: 141 MMOL/L (ref 136–145)
TRIGL SERPL-MCNC: 83 MG/DL (ref 30–149)
VLDLC SERPL CALC-MCNC: 14 MG/DL (ref 0–30)
WBC # BLD AUTO: 5.2 X10(3) UL (ref 4–11)

## 2023-01-19 PROCEDURE — 80061 LIPID PANEL: CPT | Performed by: INTERNAL MEDICINE

## 2023-01-19 PROCEDURE — 3078F DIAST BP <80 MM HG: CPT | Performed by: INTERNAL MEDICINE

## 2023-01-19 PROCEDURE — 3044F HG A1C LEVEL LT 7.0%: CPT | Performed by: INTERNAL MEDICINE

## 2023-01-19 PROCEDURE — 3060F POS MICROALBUMINURIA REV: CPT | Performed by: INTERNAL MEDICINE

## 2023-01-19 PROCEDURE — 3075F SYST BP GE 130 - 139MM HG: CPT | Performed by: INTERNAL MEDICINE

## 2023-01-19 PROCEDURE — 85025 COMPLETE CBC W/AUTO DIFF WBC: CPT | Performed by: INTERNAL MEDICINE

## 2023-01-19 PROCEDURE — 82043 UR ALBUMIN QUANTITATIVE: CPT | Performed by: INTERNAL MEDICINE

## 2023-01-19 PROCEDURE — 80053 COMPREHEN METABOLIC PANEL: CPT | Performed by: INTERNAL MEDICINE

## 2023-01-19 PROCEDURE — 99397 PER PM REEVAL EST PAT 65+ YR: CPT | Performed by: INTERNAL MEDICINE

## 2023-01-19 PROCEDURE — 83036 HEMOGLOBIN GLYCOSYLATED A1C: CPT | Performed by: INTERNAL MEDICINE

## 2023-01-19 PROCEDURE — 82570 ASSAY OF URINE CREATININE: CPT | Performed by: INTERNAL MEDICINE

## 2023-01-19 PROCEDURE — 3061F NEG MICROALBUMINURIA REV: CPT | Performed by: INTERNAL MEDICINE

## 2023-01-19 PROCEDURE — 99214 OFFICE O/P EST MOD 30 MIN: CPT | Performed by: INTERNAL MEDICINE

## 2023-01-19 PROCEDURE — 3008F BODY MASS INDEX DOCD: CPT | Performed by: INTERNAL MEDICINE

## 2023-01-19 PROCEDURE — 36415 COLL VENOUS BLD VENIPUNCTURE: CPT | Performed by: INTERNAL MEDICINE

## 2023-01-19 RX ORDER — ATORVASTATIN CALCIUM 10 MG/1
10 TABLET, FILM COATED ORAL DAILY
Qty: 90 TABLET | Refills: 1 | Status: SHIPPED | OUTPATIENT
Start: 2023-01-19

## 2023-01-19 RX ORDER — LOSARTAN POTASSIUM 100 MG/1
TABLET ORAL
Qty: 90 TABLET | Refills: 1 | Status: SHIPPED | OUTPATIENT
Start: 2023-01-19

## 2023-01-19 RX ORDER — SPIRONOLACTONE AND HYDROCHLOROTHIAZIDE 25; 25 MG/1; MG/1
0.5 TABLET ORAL DAILY
Qty: 45 TABLET | Refills: 1 | Status: SHIPPED | OUTPATIENT
Start: 2023-01-19

## 2023-01-19 RX ORDER — NORTRIPTYLINE HYDROCHLORIDE 10 MG/1
20 CAPSULE ORAL DAILY
Qty: 180 CAPSULE | Refills: 1 | Status: SHIPPED | OUTPATIENT
Start: 2023-01-19

## 2023-01-19 RX ORDER — DILTIAZEM HYDROCHLORIDE 180 MG/1
180 CAPSULE, COATED, EXTENDED RELEASE ORAL DAILY
Qty: 90 CAPSULE | Refills: 1 | Status: SHIPPED | OUTPATIENT
Start: 2023-01-19

## 2023-01-19 NOTE — ASSESSMENT & PLAN NOTE
Unremarkable exam.  Pt's colon cancer screening is no longer indicated. Immunizations were reviewed. Counseled pt regarding diet and exercise.

## 2023-03-18 DIAGNOSIS — G45.9 TIA (TRANSIENT ISCHEMIC ATTACK): ICD-10-CM

## 2023-03-20 RX ORDER — NORTRIPTYLINE HYDROCHLORIDE 10 MG/1
20 CAPSULE ORAL DAILY
Qty: 180 CAPSULE | Refills: 1 | OUTPATIENT
Start: 2023-03-20

## 2023-03-20 RX ORDER — ALPRAZOLAM 0.25 MG/1
TABLET ORAL
Qty: 15 TABLET | Refills: 0 | Status: SHIPPED | OUTPATIENT
Start: 2023-03-20

## 2023-03-20 NOTE — TELEPHONE ENCOUNTER
Please review; no protocol  Medication pended for your review and approval.     Requested Prescriptions   Pending Prescriptions Disp Refills    ALPRAZolam 0.25 MG Oral Tab 15 tablet 0     Si-2 tabs prior to procedure. (OK to try 1-2 tabs on another day to  effect. There is no refill protocol information for this order      Refused Prescriptions Disp Refills    nortriptyline 10 MG Oral Cap 180 capsule 1     Sig: Take 2 capsules (20 mg total) by mouth daily.        There is no refill protocol information for this order

## 2023-06-26 RX ORDER — NORTRIPTYLINE HYDROCHLORIDE 10 MG/1
20 CAPSULE ORAL DAILY
Qty: 180 CAPSULE | Refills: 1 | Status: SHIPPED | OUTPATIENT
Start: 2023-06-26

## 2023-06-26 NOTE — TELEPHONE ENCOUNTER
Please review;  No Protocol  Rx Pended, authorize if appropriate    Requested Prescriptions   Pending Prescriptions Disp Refills    NORTRIPTYLINE 10 MG Oral Cap [Pharmacy Med Name: NORTRIPTYLINE 10MG CAPSULES] 180 capsule 1     Sig: TAKE 2 CAPSULES(20 MG) BY MOUTH DAILY       There is no refill protocol information for this order          Recent Outpatient Visits              5 months ago Routine health maintenance    Anders Corona Brenna Crick, MD    Office Visit    1 year ago TIA (transient ischemic attack)    Anders Corona Wilburn Bilberry, Hassel Berkshire, MD    Office Visit    1 year ago Glaucoma suspect of both eyes    Francesca Painter, [address], [place]    Nurse Only    1 year ago Glaucoma suspect of both eyes    [address] [place], [place] [address]Jing MD    Office Visit    1 year ago Diet-controlled diabetes mellitus [facility])    Anders Corona, Astrid Carrasco MD    Office Visit

## 2023-07-17 DIAGNOSIS — R60.0 BILATERAL LOWER EXTREMITY EDEMA: ICD-10-CM

## 2023-07-18 ENCOUNTER — APPOINTMENT (OUTPATIENT)
Dept: GENERAL RADIOLOGY | Age: 73
End: 2023-07-18
Attending: EMERGENCY MEDICINE
Payer: MEDICAID

## 2023-07-18 ENCOUNTER — HOSPITAL ENCOUNTER (OUTPATIENT)
Age: 73
Discharge: HOME | End: 2023-07-18
Attending: EMERGENCY MEDICINE
Payer: MEDICAID

## 2023-07-18 VITALS
DIASTOLIC BLOOD PRESSURE: 78 MMHG | SYSTOLIC BLOOD PRESSURE: 134 MMHG | TEMPERATURE: 97 F | HEART RATE: 89 BPM | RESPIRATION RATE: 20 BRPM | OXYGEN SATURATION: 97 %

## 2023-07-18 DIAGNOSIS — M25.572 ACUTE LEFT ANKLE PAIN: Primary | ICD-10-CM

## 2023-07-18 PROCEDURE — 99203 OFFICE O/P NEW LOW 30 MIN: CPT

## 2023-07-18 PROCEDURE — 73610 X-RAY EXAM OF ANKLE: CPT | Performed by: EMERGENCY MEDICINE

## 2023-07-18 PROCEDURE — 99213 OFFICE O/P EST LOW 20 MIN: CPT

## 2023-07-18 RX ORDER — SPIRONOLACTONE AND HYDROCHLOROTHIAZIDE 25; 25 MG/1; MG/1
0.5 TABLET ORAL DAILY
Qty: 45 TABLET | Refills: 1 | Status: SHIPPED | OUTPATIENT
Start: 2023-07-18

## 2023-07-18 NOTE — TELEPHONE ENCOUNTER
Please review. Protocol failed / Has no protocol. Requested Prescriptions   Pending Prescriptions Disp Refills    SPIRONOLACTONE-HCTZ 25-25 MG Oral Tab [Pharmacy Med Name: SPIRONOLACTONE 25MG W/HCTZ 25MG TAB] 45 tablet 1     Sig: TAKE 1/2 TABLET BY MOUTH DAILY       Hypertensive Medications Protocol Failed - 7/17/2023 12:53 PM        Failed - EGFRCR or GFRNAA > 50     GFR Evaluation  EGFRCR: 45 , resulted on 1/19/2023          Passed - In person appointment in the past 12 or next 3 months     Recent Outpatient Visits              6 months ago Routine health maintenance    [address], [facility], Alex Becker MD    Office Visit    1 year ago TIA (transient ischemic attack)    [address], [facility], [facility], Aman Stafford MD    Office Visit    1 year ago Glaucoma suspect of both eyes    [address],[facility], [address], [place]    Nurse Only    1 year ago Glaucoma suspect of both eyes    [address] [place] [address], Wilian Saravia MD    Office Visit    1 year ago Diet-controlled diabetes mellitus [facility])    [address],[facility], [facility], Alex Becker MD    Office Visit          Future Appointments         Provider Department Appt Notes    In 4 weeks Marah Bledsoe MD [address], [place], [place] Pain in my foot perhaps due to a sprain.  Cony Gonzalez               Passed - Last BP reading less than 140/90     BP Readings from Last 1 Encounters:  01/19/23 : 137/77              Passed - CMP or BMP in past 6 months     Recent Results (from the past 4392 hour(s))   COMP METABOLIC PANEL (14)    Collection Time: 01/19/23  9:52 AM   Result Value Ref Range    Glucose 110 (H) 70 - 99 mg/dL    Sodium 141 136 - 145 mmol/L    Potassium 3.8 3.5 - 5.1 mmol/L    Chloride 106 98 - 112 mmol/L    CO2 28.0 21.0 - 32.0 mmol/L    Anion Gap 7 0 - 18 mmol/L    BUN 18 7 - 18 mg/dL    Creatinine 1.25 (H) 0.55 - 1.02 mg/dL    BUN/CREA Ratio 14.4 10.0 - 20.0    Calcium, Total 10.0 8.5 - 10.1 mg/dL    Calculated Osmolality 295 275 - 295 mOsm/kg    eGFR-Cr 45 (L) >=60 mL/min/1.73m2    ALT 26 13 - 56 U/L    AST 20 15 - 37 U/L    Alkaline Phosphatase 87 55 - 142 U/L    Bilirubin, Total 0.6 0.1 - 2.0 mg/dL    Total Protein 7.7 6.4 - 8.2 g/dL    Albumin 3.8 3.4 - 5.0 g/dL    Globulin  3.9 2.8 - 4.4 g/dL    A/G Ratio 1.0 1.0 - 2.0    Patient Fasting for CMP? Yes      *Note: Due to a large number of results and/or encounters for the requested time period, some results have not been displayed. A complete set of results can be found in Results Review. Passed - In person appointment or virtual visit in the past 6 months     Recent Outpatient Visits              6 months ago Routine health maintenance    [address], [facility], Orlando Alfaro MD    Office Visit    1 year ago TIA (transient ischemic attack)    [address], [facility], Jean Marie Palencia MD    Office Visit    1 year ago Glaucoma suspect of both eyes    [address], [address], [employer]    Nurse Only    1 year ago Glaucoma suspect of both eyes    Adria Palacio Kassandra Flack, MD    Office Visit    1 year ago Diet-controlled diabetes mellitus [facility])    [address], [facility], Orlando Alfaro MD    Office Visit          Future Appointments         Provider Department Appt Notes    In 4 weeks Simone Fuentes MD [address], [place], [place] Pain in my foot perhaps due to a sprain. Patricia Mccoy                  Future Appointments         Provider Department Appt Notes    In 4 weeks Simone Fuentes MD [address], [place], [place] Pain in my foot perhaps due to a sprain.  Patricia Mccoy           Recent Outpatient Visits              6 months ago Routine health maintenance    [address], [facility], Laurie Weathers MD    Office Visit    1 year ago TIA (transient ischemic attack)    [address], [facility], Tai Rubin MD    Office Visit    1 year ago Glaucoma suspect of both eyes    [address], [address], [place]    Nurse Only    1 year ago Glaucoma suspect of both eyes    [address] [facility]    Office Visit    1 year ago Diet-controlled diabetes mellitus [facility])    [address] [facility], Laurie Weathers MD    Office Visit

## 2023-08-10 DIAGNOSIS — I10 ESSENTIAL HYPERTENSION: ICD-10-CM

## 2023-08-10 NOTE — TELEPHONE ENCOUNTER
Please review; protocol failed. No active /future labs noted   Message sent for patient to make an appointment. Requested Prescriptions   Pending Prescriptions Disp Refills    DILTIAZEM HCL ER COATED BEADS 180 MG Oral Capsule SR 24 Hr [Pharmacy Med Name: DILTIAZEM CD 180MG CAPSULES (24 HR)] 90 capsule 1     Sig: TAKE 1 CAPSULE(180 MG) BY MOUTH DAILY       Hypertensive Medications Protocol Failed - 8/10/2023  2:00 PM        Failed - CMP or BMP in past 6 months     No results found for this or any previous visit (from the past 4392 hour(s)). Failed - In person appointment or virtual visit in the past 6 months     Recent Outpatient Visits              6 months ago Routine health maintenance    Sg Lewis MD    Office Visit    1 year ago TIA (transient ischemic attack)    [address], [place], Anabel Guevara MD    Office Visit    1 year ago Glaucoma suspect of both eyes    [address], [address], [place]    Nurse Only    1 year ago Glaucoma suspect of both eyes    Adria Lewis Trula Reams, MD    Office Visit    1 year ago Diet-controlled diabetes mellitus [facility])    [address], [place], Sg Gaxiola MD    Office Visit          Future Appointments         Provider Department Appt Notes    In 5 days Romi Dixon MD [address], [address] [place], [place] Pain in my foot perhaps due to a sprain.  Cony Gonzalez               Failed - EGFRCR or GFRNAA > 50     GFR Evaluation  EGFRCR: 45 , resulted on 1/19/2023          Passed - In person appointment in the past 12 or next 3 months     Recent Outpatient Visits              6 months ago Routine health maintenance    [address], [place], Anabel Guevara MD    Office Visit    1 year ago TIA (transient ischemic attack) Clarence Spencer MD    Office Visit    1 year ago Glaucoma suspect of both eyes    [facility], [address], [place]    Nurse Only    1 year ago Glaucoma suspect of both eyes    Adria Williamson Albertha Dustman, MD    Office Visit    1 year ago Diet-controlled diabetes mellitus [facility])    [facility] Melissa [facility]    Office Visit          Future Appointments         Provider Department Appt Notes    In 5 days MD Sarah Church [facility], [place] Pain in my foot perhaps due to a sprain. Cony Gonzalez               Passed - Last BP reading less than 140/90     BP Readings from Last 1 Encounters:  07/18/23 : 134/78                 Recent Outpatient Visits              6 months ago Routine health maintenance    [facility]    Office Visit    1 year ago TIA (transient ischemic attack)    [facility], Yasmeen Chopra MD    Office Visit    1 year ago Glaucoma suspect of both eyes    Sarahapollo Amezquita, [address], [place]    Nurse Only    1 year ago Glaucoma suspect of both eyes    Adria Williamson Albertha Dustman, MD    Office Visit    1 year ago Diet-controlled diabetes mellitus [facility])    [facility] Melissa Rod[facility]    Office Visit          Future Appointments         Provider Department Appt Notes    In 5 days [facility] [place] Pain in my foot perhaps due to a sprain.  Tae Mckenzie

## 2023-08-11 RX ORDER — DILTIAZEM HYDROCHLORIDE 180 MG/1
180 CAPSULE, COATED, EXTENDED RELEASE ORAL DAILY
Qty: 90 CAPSULE | Refills: 0 | Status: SHIPPED | OUTPATIENT
Start: 2023-08-11

## 2023-08-15 ENCOUNTER — OFFICE VISIT (OUTPATIENT)
Facility: CLINIC | Age: 73
End: 2023-08-15

## 2023-08-15 VITALS
HEIGHT: 65 IN | OXYGEN SATURATION: 95 % | BODY MASS INDEX: 32.1 KG/M2 | RESPIRATION RATE: 18 BRPM | HEART RATE: 91 BPM | WEIGHT: 192.63 LBS | DIASTOLIC BLOOD PRESSURE: 64 MMHG | SYSTOLIC BLOOD PRESSURE: 112 MMHG

## 2023-08-15 DIAGNOSIS — Z78.0 ASYMPTOMATIC MENOPAUSE: ICD-10-CM

## 2023-08-15 DIAGNOSIS — F32.0 MILD MAJOR DEPRESSION (HCC): ICD-10-CM

## 2023-08-15 DIAGNOSIS — Z12.11 COLON CANCER SCREENING: ICD-10-CM

## 2023-08-15 DIAGNOSIS — I10 ESSENTIAL HYPERTENSION: ICD-10-CM

## 2023-08-15 DIAGNOSIS — N18.32 TYPE 2 DIABETES MELLITUS WITH STAGE 3B CHRONIC KIDNEY DISEASE, WITHOUT LONG-TERM CURRENT USE OF INSULIN (HCC): ICD-10-CM

## 2023-08-15 DIAGNOSIS — E11.22 TYPE 2 DIABETES MELLITUS WITH STAGE 3B CHRONIC KIDNEY DISEASE, WITHOUT LONG-TERM CURRENT USE OF INSULIN (HCC): ICD-10-CM

## 2023-08-15 DIAGNOSIS — M79.605 PAIN OF LEFT LOWER EXTREMITY: Primary | ICD-10-CM

## 2023-08-15 LAB
CARTRIDGE LOT#: 603 NUMERIC
HEMOGLOBIN A1C: 6.3 % (ref 4.3–5.6)

## 2023-08-15 PROCEDURE — 3074F SYST BP LT 130 MM HG: CPT | Performed by: INTERNAL MEDICINE

## 2023-08-15 PROCEDURE — 3044F HG A1C LEVEL LT 7.0%: CPT | Performed by: INTERNAL MEDICINE

## 2023-08-15 PROCEDURE — 3008F BODY MASS INDEX DOCD: CPT | Performed by: INTERNAL MEDICINE

## 2023-08-15 PROCEDURE — 83036 HEMOGLOBIN GLYCOSYLATED A1C: CPT | Performed by: INTERNAL MEDICINE

## 2023-08-15 PROCEDURE — 3078F DIAST BP <80 MM HG: CPT | Performed by: INTERNAL MEDICINE

## 2023-08-15 PROCEDURE — 99214 OFFICE O/P EST MOD 30 MIN: CPT | Performed by: INTERNAL MEDICINE

## 2023-08-15 RX ORDER — LOSARTAN POTASSIUM 100 MG/1
TABLET ORAL
Qty: 90 TABLET | Refills: 1 | Status: SHIPPED | OUTPATIENT
Start: 2023-08-15

## 2023-08-15 NOTE — ASSESSMENT & PLAN NOTE
This is improving. Likely due to muscle strain or sciatica. Advised patient to avoid NSAIDs because of chronic kidney disease. Okay to take Tylenol up to 3000 mg daily. Follow-up in 1 month.

## 2023-08-15 NOTE — ASSESSMENT & PLAN NOTE
Patient's depression is worse. She is taking low-dose nortriptyline for neck pain. We will add sertraline. Side effects discussed with patient and son. Follow-up 2 to 4 weeks after starting medication.

## 2023-09-13 ENCOUNTER — TELEMEDICINE (OUTPATIENT)
Dept: INTERNAL MEDICINE CLINIC | Facility: CLINIC | Age: 73
End: 2023-09-13

## 2023-09-13 DIAGNOSIS — F41.9 ANXIETY: ICD-10-CM

## 2023-09-13 DIAGNOSIS — E11.22 TYPE 2 DIABETES MELLITUS WITH STAGE 3B CHRONIC KIDNEY DISEASE, WITHOUT LONG-TERM CURRENT USE OF INSULIN (HCC): ICD-10-CM

## 2023-09-13 DIAGNOSIS — I10 ESSENTIAL HYPERTENSION: Primary | ICD-10-CM

## 2023-09-13 DIAGNOSIS — N18.32 TYPE 2 DIABETES MELLITUS WITH STAGE 3B CHRONIC KIDNEY DISEASE, WITHOUT LONG-TERM CURRENT USE OF INSULIN (HCC): ICD-10-CM

## 2023-09-13 PROCEDURE — 99214 OFFICE O/P EST MOD 30 MIN: CPT | Performed by: INTERNAL MEDICINE

## 2023-09-13 RX ORDER — DILTIAZEM HYDROCHLORIDE 180 MG/1
180 CAPSULE, COATED, EXTENDED RELEASE ORAL DAILY
Qty: 90 CAPSULE | Refills: 3 | Status: SHIPPED | OUTPATIENT
Start: 2023-09-13

## 2023-09-13 RX ORDER — NORTRIPTYLINE HYDROCHLORIDE 10 MG/1
20 CAPSULE ORAL DAILY
Qty: 180 CAPSULE | Refills: 3 | Status: SHIPPED | OUTPATIENT
Start: 2023-09-13

## 2023-09-13 RX ORDER — LOSARTAN POTASSIUM 100 MG/1
TABLET ORAL
Qty: 90 TABLET | Refills: 3 | Status: SHIPPED | OUTPATIENT
Start: 2023-09-13

## 2023-10-18 DIAGNOSIS — R60.0 BILATERAL LOWER EXTREMITY EDEMA: ICD-10-CM

## 2023-10-19 RX ORDER — SPIRONOLACTONE AND HYDROCHLOROTHIAZIDE 25; 25 MG/1; MG/1
0.5 TABLET ORAL DAILY
Qty: 45 TABLET | Refills: 1 | Status: SHIPPED | OUTPATIENT
Start: 2023-10-19

## 2023-10-19 NOTE — TELEPHONE ENCOUNTER
Please review; protocol failed. No active /future labs noted     Requested Prescriptions   Pending Prescriptions Disp Refills    SPIRONOLACTONE-HCTZ 25-25 MG Oral Tab [Pharmacy Med Name: SPIRONOLACTONE 25MG W/HCTZ 25MG TAB] 45 tablet 1     Sig: TAKE 1/2 TABLET BY MOUTH DAILY       Hypertensive Medications Protocol Failed - 10/18/2023  8:08 AM        Failed - CMP or BMP in past 6 months     No results found for this or any previous visit (from the past 4392 hour(s)).             Failed - EGFRCR or GFRNAA > 50     GFR Evaluation  EGFRCR: 45 , resulted on 1/19/2023          Passed - In person appointment in the past 12 or next 3 months     Recent Outpatient Visits              1 month ago Essential hypertension    Cris Garcia [facility], Mendoza Lobo MD    Telemedicine    2 months ago Pain of left lower extremity    Shantell Castrejon MD    Office Visit    9 months ago Routine health maintenance    Cris Garcia [facility] Elisabeth Munguia MD    Office Visit    1 year ago TIA (transient ischemic attack)    Cris Garcia [facility] Elisabeth Mungiua MD    Office Visit    1 year ago Glaucoma suspect of both eyes    Cris Garcia, [address], [address]    Nurse Only          Future Appointments         Provider Department Appt Notes    In 5 months MD Cris Griffiths, [address], [address] EP DM EE   Appt booked by son                      Passed - Last BP reading less than 140/90     BP Readings from Last 1 Encounters:  08/15/23 : 112/64              Passed - In person appointment or virtual visit in the past 6 months     Recent Outpatient Visits              1 month ago Essential hypertension    Cris Garcia, [address] [place] [address], Mendoza Lobo MD    Telemedicine    2 months ago Pain of left lower extremity Kiley Adkins MD    Office Visit    9 months ago Routine health maintenance    Linda Galdamez [facility] Ezra, Sg Gaxiola MD    Office Visit    1 year ago TIA (transient ischemic attack)    Linda Galdamez [facility], Sg Gaxiola MD    Office Visit    1 year ago Glaucoma suspect of both eyes    Linda Galdamez, [address], [place]    Nurse Only          Future Appointments         Provider Department Appt Notes    In 5 months MD Linda King, [address], [place] EP DM EE   Appt booked by chey                         Recent Outpatient Visits              1 month ago Essential hypertension    Linda Galdamez [facility], Anabel Guevara MD    Telemedicine    2 months ago Pain of left lower extremity    Chai Cali MD    Office Visit    9 months ago Routine health maintenance    Julieta Pekc, Sg Gaxiola MD    Office Visit    1 year ago TIA (transient ischemic attack)    Linda Galdamez [facility] [place], Sg Gaxiola MD    Office Visit    1 year ago Glaucoma suspect of both eyes    Linda Galdamez, [address], [place]    Nurse Only          Future Appointments         Provider Department Appt Notes    In 5 months MD Linda King, [address], [place] EP DM EE   Appt booked by chey

## 2023-11-07 DIAGNOSIS — I10 ESSENTIAL HYPERTENSION: ICD-10-CM

## 2023-11-08 RX ORDER — DILTIAZEM HYDROCHLORIDE 180 MG/1
180 CAPSULE, COATED, EXTENDED RELEASE ORAL DAILY
Qty: 90 CAPSULE | Refills: 3 | OUTPATIENT
Start: 2023-11-08

## 2023-11-08 NOTE — TELEPHONE ENCOUNTER
Year supply sent on 9/13/23. dilTIAZem HCl ER Coated Beads 180 MG Oral Capsule SR 24 Hr 90 capsule 3 9/13/2023     Sig - Route: Take 1 capsule (180 mg total) by mouth daily. - Oral    Sent to pharmacy as: dilTIAZem HCl ER Coated Beads 180 MG Oral Capsule Extended Release 24 Hour (CARDIZEM CD)    Notes to Pharmacy: I am aware of the drug interactions and will monitor this.     E-Prescribing Status: Receipt confirmed by pharmacy (9/13/2023 11:41 AM CDT)      Associated Diagnoses    Essential hypertension  - Italo Alexandre  93. #[id number] - [place], [address] [facility], [phone], [phone]

## 2023-11-09 DIAGNOSIS — I10 ESSENTIAL HYPERTENSION: ICD-10-CM

## 2023-11-09 RX ORDER — DILTIAZEM HYDROCHLORIDE 180 MG/1
180 CAPSULE, COATED, EXTENDED RELEASE ORAL DAILY
Qty: 90 CAPSULE | Refills: 3 | OUTPATIENT
Start: 2023-11-09

## 2023-11-11 DIAGNOSIS — F41.9 ANXIETY: ICD-10-CM

## 2024-02-13 DIAGNOSIS — I10 ESSENTIAL HYPERTENSION: ICD-10-CM

## 2024-02-13 RX ORDER — DILTIAZEM HYDROCHLORIDE 180 MG/1
180 CAPSULE, COATED, EXTENDED RELEASE ORAL DAILY
Qty: 90 CAPSULE | Refills: 3 | Status: CANCELLED | OUTPATIENT
Start: 2024-02-13

## 2024-02-14 NOTE — TELEPHONE ENCOUNTER
received a refill request from the patients pharmacy, patient needs to schedule an appt with a different provider first before she can get a refill.   If she calls back please schedule an appt with Dr Humphrey, Dr Ratliff, Nathalia Shukla or Dr Roe

## 2024-02-14 NOTE — TELEPHONE ENCOUNTER
Pharmacy requesting refill of     dilTIAZem HCl ER Coated Beads 180 MG Oral Capsule SR 24 Hr, Take 1 capsule (180 mg total) by mouth daily., Disp: 90 capsule, Rfl: 3

## 2024-02-15 NOTE — TELEPHONE ENCOUNTER
Spoke to patient's son Imtiaz (on SHANE). He states  gave patient 3 month supply of medication,[place].She will call back when she comes back and schedule.

## 2024-06-18 ENCOUNTER — OFFICE VISIT (OUTPATIENT)
Dept: INTERNAL MEDICINE CLINIC | Facility: CLINIC | Age: 74
End: 2024-06-18

## 2024-06-18 VITALS
SYSTOLIC BLOOD PRESSURE: 131 MMHG | RESPIRATION RATE: 18 BRPM | WEIGHT: 193 LBS | BODY MASS INDEX: 32.15 KG/M2 | HEART RATE: 91 BPM | DIASTOLIC BLOOD PRESSURE: 79 MMHG | HEIGHT: 65 IN

## 2024-06-18 DIAGNOSIS — N18.32 STAGE 3B CHRONIC KIDNEY DISEASE (HCC): ICD-10-CM

## 2024-06-18 DIAGNOSIS — I10 ESSENTIAL HYPERTENSION: ICD-10-CM

## 2024-06-18 DIAGNOSIS — E11.9 TYPE 2 DIABETES MELLITUS WITHOUT COMPLICATION, WITHOUT LONG-TERM CURRENT USE OF INSULIN (HCC): ICD-10-CM

## 2024-06-18 DIAGNOSIS — F41.9 ANXIETY: ICD-10-CM

## 2024-06-18 DIAGNOSIS — R41.3 MEMORY IMPAIRMENT OF GRADUAL ONSET: ICD-10-CM

## 2024-06-18 DIAGNOSIS — Z00.00 PHYSICAL EXAM, ROUTINE: Primary | ICD-10-CM

## 2024-06-18 DIAGNOSIS — H40.003 GLAUCOMA SUSPECT OF BOTH EYES: ICD-10-CM

## 2024-06-18 PROBLEM — R60.0 BILATERAL LOWER EXTREMITY EDEMA: Status: RESOLVED | Noted: 2019-03-18 | Resolved: 2024-06-18

## 2024-06-18 PROBLEM — D64.9 ANEMIA: Status: RESOLVED | Noted: 2018-12-15 | Resolved: 2024-06-18

## 2024-06-18 PROBLEM — R55 SYNCOPE: Status: RESOLVED | Noted: 2021-12-20 | Resolved: 2024-06-18

## 2024-06-18 PROCEDURE — 99397 PER PM REEVAL EST PAT 65+ YR: CPT | Performed by: INTERNAL MEDICINE

## 2024-06-19 NOTE — PROGRESS NOTES
Subjective:     Patient ID: Cony Gonzalez is a 74 year old female.  Presents for physical exam    HPI  Patient is here accompanied by her son, patient speaks Irish send son is the main historian.  He states that patient lately seen shows signs of mild dementia more forgetful, forgetting to do stuff that she has done for many many years.  She has severe anxiety, since started taking sertraline and nortriptyline anxiety lessened, she always feels better after visit Gail which she did recently.  She takes all medication as prescribed.  Trying to watch reasonable diet but it is not always feasible    Review of Systems       Constitutional:  Negative for decreased activity, fever, irritability and lethargy  Cardiovascular:  Negative for chest pain and irregular heartbeat/palpitations  Respiratory:  Negative for cough, dyspnea and wheezing.  Eyes:  Negative for eye discharge and vision loss  Endocrine:  Negative for polydipsia and polyphagia  Integumentary:  Negative for pruritus and rash  Neurological:  Negative for gait disturbance, paresthesias.   Psychiatric:  Negative for inappropriate interaction and psychiatric symptoms  Current Outpatient Medications   Medication Sig Dispense Refill    Spironolactone-HCTZ 25-25 MG Oral Tab Take 0.5 tablets by mouth daily. 45 tablet 1    dilTIAZem HCl ER Coated Beads 180 MG Oral Capsule SR 24 Hr Take 1 capsule (180 mg total) by mouth daily. 90 capsule 3    losartan 100 MG Oral Tab TAKE 1 TABLET BY MOUTH DAILY 90 tablet 3    nortriptyline 10 MG Oral Cap Take 2 capsules (20 mg total) by mouth daily. 180 capsule 3    sertraline 50 MG Oral Tab Take 1 tablet (50 mg total) by mouth daily. 90 tablet 3    atorvastatin 10 MG Oral Tab Take 1 tablet (10 mg total) by mouth daily. 90 tablet 3    aspirin 325 MG Oral Tab Take 1 tablet (325 mg total) by mouth daily. 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      Past Surgical History:   Procedure Laterality Date    Laser trabeculoplasty argon - os - left eye Left 10/27/15    inferior 180* / Kelsea Avila MD    Yag iridotomy - ou - both eyes Bilateral       Family History   Problem Relation Age of Onset    Hypertension Mother     Heart Disorder Mother     Glaucoma Brother     Diabetes Neg     Cancer Neg     Macular degeneration Neg       Social History:   Social History     Socioeconomic History    Marital status:    Tobacco Use    Smoking status: Never    Smokeless tobacco: Never   Vaping Use    Vaping status: Never Used   Substance and Sexual Activity    Alcohol use: No     Alcohol/week: 0.0 standard drinks of alcohol    Drug use: No    Sexual activity: Not Currently     Partners: Male        /79 (BP Location: Right arm, Patient Position: Sitting, Cuff Size: large)   Pulse 91   Resp 18   Ht 5' 5\" (1.651 m)   Wt 193 lb (87.5 kg)   BMI 32.12 kg/m²    Physical Exam  Constitutional:       Appearance: Normal appearance. She is obese.   HENT:      Head: Normocephalic and atraumatic.   Eyes:      General: No scleral icterus.     Extraocular Movements: Extraocular movements intact.      Conjunctiva/sclera: Conjunctivae normal.      Pupils: Pupils are equal, round, and reactive to light.   Cardiovascular:      Rate and Rhythm: Normal rate and regular rhythm.      Heart sounds: No murmur heard.     No gallop.   Pulmonary:      Effort: Pulmonary effort is normal. No respiratory distress.      Breath sounds: Normal breath sounds. No wheezing or rhonchi.   Abdominal:      General: Bowel sounds are normal.      Palpations: Abdomen is soft. There is no mass.      Tenderness: There is no abdominal tenderness. There is no guarding or rebound.   Musculoskeletal:         General: Normal range of motion.      Cervical back:  Normal range of motion and neck supple.      Right lower leg: No edema.      Left lower leg: No edema.   Skin:     General: Skin is warm.      Coloration: Skin is not jaundiced.   Neurological:      General: No focal deficit present.      Mental Status: She is alert and oriented to person, place, and time. Mental status is at baseline.   Psychiatric:         Mood and Affect: Mood normal.         Behavior: Behavior normal.         Thought Content: Thought content normal.         Assessment & Plan:   1. Physical exam, routine    2. Type 2 diabetes mellitus without complication, without long-term current use of insulin (HCC)    3. Essential hypertension controlled on current medications   4. Anxiety continue sertraline and nortriptyline   5. Glaucoma suspect of both eyes see ophthalmology as planned   6. Memory impairment of gradual onset    7. Stage 3b chronic kidney disease (HCC) stable clinically, encourage adequate amount of liquids daily up  664 ounces a day, check electrolytes and kidney function test periodically   8.      Memory impairment, advised to consider adding Aricept discussed the effect    Orders Placed This Encounter   Procedures    CBC With Differential With Platelet    Comp Metabolic Panel (14)    Hemoglobin A1C [E]    Microalb/Creat Ratio, Random Urine [E]    Lipid Panel       Meds This Visit:  Requested Prescriptions      No prescriptions requested or ordered in this encounter       Follow-up in 6 months

## 2024-06-22 ENCOUNTER — LAB ENCOUNTER (OUTPATIENT)
Dept: LAB | Age: 74
End: 2024-06-22
Attending: INTERNAL MEDICINE

## 2024-06-22 DIAGNOSIS — E11.9 TYPE 2 DIABETES MELLITUS WITHOUT COMPLICATION, WITHOUT LONG-TERM CURRENT USE OF INSULIN (HCC): ICD-10-CM

## 2024-06-22 DIAGNOSIS — Z00.00 PHYSICAL EXAM, ROUTINE: ICD-10-CM

## 2024-06-22 LAB
ALBUMIN SERPL-MCNC: 4.4 G/DL (ref 3.2–4.8)
ALBUMIN/GLOB SERPL: 1.5 {RATIO} (ref 1–2)
ALP LIVER SERPL-CCNC: 76 U/L
ALT SERPL-CCNC: 14 U/L
ANION GAP SERPL CALC-SCNC: 7 MMOL/L (ref 0–18)
AST SERPL-CCNC: 21 U/L (ref ?–34)
BASOPHILS # BLD AUTO: 0.07 X10(3) UL (ref 0–0.2)
BASOPHILS NFR BLD AUTO: 1.2 %
BILIRUB SERPL-MCNC: 0.7 MG/DL (ref 0.2–1.1)
BUN BLD-MCNC: 36 MG/DL (ref 9–23)
BUN/CREAT SERPL: 22.9 (ref 10–20)
CALCIUM BLD-MCNC: 10.2 MG/DL (ref 8.7–10.4)
CHLORIDE SERPL-SCNC: 104 MMOL/L (ref 98–112)
CHOLEST SERPL-MCNC: 153 MG/DL (ref ?–200)
CO2 SERPL-SCNC: 27 MMOL/L (ref 21–32)
CREAT BLD-MCNC: 1.57 MG/DL
CREAT UR-SCNC: 139.2 MG/DL
DEPRECATED RDW RBC AUTO: 41.9 FL (ref 35.1–46.3)
EGFRCR SERPLBLD CKD-EPI 2021: 34 ML/MIN/1.73M2 (ref 60–?)
EOSINOPHIL # BLD AUTO: 0.12 X10(3) UL (ref 0–0.7)
EOSINOPHIL NFR BLD AUTO: 2 %
ERYTHROCYTE [DISTWIDTH] IN BLOOD BY AUTOMATED COUNT: 12.7 % (ref 11–15)
EST. AVERAGE GLUCOSE BLD GHB EST-MCNC: 146 MG/DL (ref 68–126)
FASTING PATIENT LIPID ANSWER: YES
FASTING STATUS PATIENT QL REPORTED: YES
GLOBULIN PLAS-MCNC: 2.9 G/DL (ref 2–3.5)
GLUCOSE BLD-MCNC: 118 MG/DL (ref 70–99)
HBA1C MFR BLD: 6.7 % (ref ?–5.7)
HCT VFR BLD AUTO: 32.2 %
HDLC SERPL-MCNC: 49 MG/DL (ref 40–59)
HGB BLD-MCNC: 11.1 G/DL
IMM GRANULOCYTES # BLD AUTO: 0.01 X10(3) UL (ref 0–1)
IMM GRANULOCYTES NFR BLD: 0.2 %
LDLC SERPL CALC-MCNC: 89 MG/DL (ref ?–100)
LYMPHOCYTES # BLD AUTO: 1.54 X10(3) UL (ref 1–4)
LYMPHOCYTES NFR BLD AUTO: 26.2 %
MCH RBC QN AUTO: 30.7 PG (ref 26–34)
MCHC RBC AUTO-ENTMCNC: 34.5 G/DL (ref 31–37)
MCV RBC AUTO: 89 FL
MICROALBUMIN UR-MCNC: <0.3 MG/DL
MONOCYTES # BLD AUTO: 0.39 X10(3) UL (ref 0.1–1)
MONOCYTES NFR BLD AUTO: 6.6 %
NEUTROPHILS # BLD AUTO: 3.75 X10 (3) UL (ref 1.5–7.7)
NEUTROPHILS # BLD AUTO: 3.75 X10(3) UL (ref 1.5–7.7)
NEUTROPHILS NFR BLD AUTO: 63.8 %
NONHDLC SERPL-MCNC: 104 MG/DL (ref ?–130)
OSMOLALITY SERPL CALC.SUM OF ELEC: 295 MOSM/KG (ref 275–295)
PLATELET # BLD AUTO: 249 10(3)UL (ref 150–450)
POTASSIUM SERPL-SCNC: 4 MMOL/L (ref 3.5–5.1)
PROT SERPL-MCNC: 7.3 G/DL (ref 5.7–8.2)
RBC # BLD AUTO: 3.62 X10(6)UL
SODIUM SERPL-SCNC: 138 MMOL/L (ref 136–145)
TRIGL SERPL-MCNC: 75 MG/DL (ref 30–149)
VLDLC SERPL CALC-MCNC: 12 MG/DL (ref 0–30)
WBC # BLD AUTO: 5.9 X10(3) UL (ref 4–11)

## 2024-06-22 PROCEDURE — 83036 HEMOGLOBIN GLYCOSYLATED A1C: CPT

## 2024-06-22 PROCEDURE — 80061 LIPID PANEL: CPT

## 2024-06-22 PROCEDURE — 85025 COMPLETE CBC W/AUTO DIFF WBC: CPT

## 2024-06-22 PROCEDURE — 82043 UR ALBUMIN QUANTITATIVE: CPT

## 2024-06-22 PROCEDURE — 82570 ASSAY OF URINE CREATININE: CPT

## 2024-06-22 PROCEDURE — 36415 COLL VENOUS BLD VENIPUNCTURE: CPT

## 2024-06-22 PROCEDURE — 80053 COMPREHEN METABOLIC PANEL: CPT

## 2024-08-22 DIAGNOSIS — R60.0 BILATERAL LOWER EXTREMITY EDEMA: ICD-10-CM

## 2024-08-22 NOTE — TELEPHONE ENCOUNTER
Spironolactone-HCTZ 25-25 MG Oral Tab, Take 0.5 tablets by mouth daily., Disp: 45 tablet, Rfl: 1

## 2024-08-23 RX ORDER — SPIRONOLACTONE AND HYDROCHLOROTHIAZIDE 25; 25 MG/1; MG/1
0.5 TABLET ORAL DAILY
Qty: 45 TABLET | Refills: 3 | Status: SHIPPED | OUTPATIENT
Start: 2024-08-23

## 2024-08-23 NOTE — TELEPHONE ENCOUNTER
Please Review. Protocol Failed; No Protocol   Previously written by Dr. Machuca   Recent Visits  Date Type Provider Dept   06/18/24 Office Visit Jackelin Humphrey MD [facility]       Requested Prescriptions   Pending Prescriptions Disp Refills    Spironolactone-HCTZ 25-25 MG Oral Tab 45 tablet 1     Sig: Take 0.5 tablets by mouth daily.       Hypertension Medications Protocol Failed - 8/22/2024 12:10 PM        Failed - EGFRCR or GFRNAA > 50     GFR Evaluation  EGFRCR: 34 , resulted on 6/22/2024          Passed - CMP or BMP in past 12 months        Passed - Last BP reading less than 140/90     BP Readings from Last 1 Encounters:   06/18/24 131/79               Passed - In person appointment or virtual visit in the past 12 mos or appointment in next 3 mos     Recent Outpatient Visits              2 months ago Physical exam, routine    [facility] [place] K[place], Ninel, MD    Office Visit    11 months ago Essential hypertension    [facility] [place], [place], Laura, MD    Telemedicine    1 year ago [facility] left lower extremity    [facility]    Office Visit    1 year ago Routine health maintenance    [facility] [place], [place], Laura, MD    Office Visit    2 years ago TIA (transient ischemic attack)    [facility] [place], [place] V[place]one, Laura, MD    Office Visit                               Recent Outpatient Visits              2 months ago Physical exam, routine    [facility] [place], [place] Kandel, Ninel, MD    Office Visit    11 months ago Essential hypertension    [facility] [place], [place] Vetrone, Laura, MD    Telemedicine    1 year ago Pain of left lower extremity    [facility] [place]    Office Visit    1 year ago  Routine health maintenance    [facility], [place], Laura, MD    Office Visit    2 years ago TIA (transient ischemic attack)    [facility], [place], Laura, MD    Office Visit

## 2024-09-11 DIAGNOSIS — F41.9 ANXIETY: ICD-10-CM

## 2024-09-11 NOTE — TELEPHONE ENCOUNTER
nortriptyline 10 MG Oral Cap, Take 2 capsules (20 mg total) by mouth daily., Disp: 180 capsule, Rfl: 3

## 2024-09-12 ENCOUNTER — TELEPHONE (OUTPATIENT)
Dept: FAMILY MEDICINE CLINIC | Facility: CLINIC | Age: 74
End: 2024-09-12

## 2024-09-12 RX ORDER — NORTRIPTYLINE HCL 10 MG
20 CAPSULE ORAL DAILY
Qty: 180 CAPSULE | Refills: 1 | Status: SHIPPED | OUTPATIENT
Start: 2024-09-12

## 2024-09-12 NOTE — TELEPHONE ENCOUNTER
Please Review. Protocol Failed; No Protocol     Requested Prescriptions   Pending Prescriptions Disp Refills    nortriptyline 10 MG Oral Cap 180 capsule 3     Sig: Take 2 capsules (20 mg total) by mouth daily.       There is no refill protocol information for this order              Recent Outpatient Visits              2 months ago Physical exam, routine    [facility]    Office Visit    1 year ago Essential hypertension    [facility] [place]one, Laura, MD    Telemedicine    1 year ago Pain of left lower extremity    [facility] [place], [place]    Office Visit    1 year ago Routine health maintenance    [facility], [place], Laura, MD    Office Visit    2 years ago TIA (transient ischemic attack)    [facility], [place], Laura, MD    Office Visit

## 2024-10-26 DIAGNOSIS — I10 ESSENTIAL HYPERTENSION: ICD-10-CM

## 2024-10-26 DIAGNOSIS — F41.9 ANXIETY: ICD-10-CM

## 2024-10-26 NOTE — TELEPHONE ENCOUNTER
dilTIAZem HCl ER Coated Beads 180 MG Oral Capsule SR 24 Hr, Take 1 capsule (180 mg total) by mouth daily., Disp: 90 capsule, Rfl: 3      sertraline 50 MG Oral Tab, Take 1 tablet (50 mg total) by mouth daily., Disp: 90 tablet, Rfl: 3

## 2024-10-29 RX ORDER — DILTIAZEM HYDROCHLORIDE 180 MG/1
180 CAPSULE, COATED, EXTENDED RELEASE ORAL DAILY
Qty: 90 CAPSULE | Refills: 1 | Status: SHIPPED | OUTPATIENT
Start: 2024-10-29

## 2024-10-29 NOTE — TELEPHONE ENCOUNTER
Please review; protocol failed/ has no protocol      Please review pended refill request as unable to refill due to high/very high interaction warning copied here:      Very High  Drug-Drug: sertraline and nortriptylineThe plasma concentrations and pharmacologic effects of tricyclic antidepressants (TCAs) may be increased by sertraline. Additive serotonergic effects may also occur during coadministration of these agents, and the risk of developing serotonin syndrome may be increased.  Details  Override reason        sertraline 50 MG Oral Tab  Prescription. Reordered.  nortriptyline 10 MG Oral CapPrescription. Active.    Requested Prescriptions   Pending Prescriptions Disp Refills    dilTIAZem HCl ER Coated Beads 180 MG Oral Capsule SR 24 Hr 90 capsule 3     Sig: Take 1 capsule (180 mg total) by mouth daily.       Hypertension Medications Protocol Failed - 10/29/2024  9:33 AM        Failed - EGFRCR or GFRNAA > 50     GFR Evaluation  EGFRCR: 34 , resulted on 6/22/2024          Passed - CMP or BMP in past 12 months        Passed - Last BP reading less than 140/90     BP Readings from Last 1 Encounters:   06/18/24 131/79               Passed - In person appointment or virtual visit in the past 12 mos or appointment in next 3 mos     Recent Outpatient Visits              4 months ago Physical exam, routine    [facility] [place]    Office Visit    1 year ago Essential hypertension    [facility] [place] Vetrone, Laura, MD    Telemedicine    1 year ago [facility] of left lower extremity    [facility]    Office Visit    1 year ago Routine health maintenance    [facility] [place] Vetrone, Laura, MD    Office Visit    2 years ago TIA (transient ischemic attack)    [facility] [place] Vetrone, Laura, MD    Office Visit                         sertraline 50 MG Oral Tab 90 tablet 3     Sig: Take 1 tablet (50 mg total) by mouth daily.       Psychiatric Non-Scheduled (Anti-Anxiety) Passed - 10/29/2024  9:33 AM        Passed - In person appointment or virtual visit in the past 6 mos or appointment in next 3 mos     Recent Outpatient Visits              4 months ago Physical exam, routine    [facility] [place] Kandel, Ninel, MD    Office Visit    1 year ago Essential hypertension    [facility] [place] Vetrone, Laura, MD    Telemedicine    1 year ago [facility] left lower extremity    [facility]    [facility]    1 year ago Routine health maintenance    [facility] [place] Vetrone, Laura, MD    Office Visit    2 years ago TIA (transient ischemic attack)    [facility] [place] Vetrone, Laura, MD    Office Visit                      Passed - Depression Screening completed within the past 12 months           Recent Outpatient Visits              4 months ago Physical exam, routine    [facility] [place] Kandel, Ninel, MD    Office Visit    1 year ago Essential hypertension    [facility] [place] Vetrone, Laura, MD    Telemedicine    1 year ago Pain of left lower extremity    [facility]    Office Visit    1 year ago Routine health maintenance    [facility] [place] Vetrone, Laura, MD    Office Visit    2 years ago TIA (transient ischemic attack)    [facility] [place] Vetrone, Laura, MD    Office Visit

## 2024-10-29 NOTE — TELEPHONE ENCOUNTER
Refill passed per [facility] protocol.  Requested Prescriptions   Pending Prescriptions Disp Refills    dilTIAZem HCl ER Coated Beads 180 MG Oral Capsule SR 24 Hr 90 capsule 3     Sig: Take 1 capsule (180 mg total) by mouth daily.       Hypertension Medications Protocol Failed - 10/29/2024  9:32 AM        Failed - EGFRCR or GFRNAA > 50     GFR Evaluation  EGFRCR: 34 , resulted on 6/22/2024          Passed - CMP or BMP in past 12 months        Passed - Last BP reading less than 140/90     BP Readings from Last 1 Encounters:   06/18/24 131/79               Passed - In person appointment or virtual visit in the past 12 mos or appointment in next 3 mos     Recent Outpatient Visits              4 months ago Physical exam, routine    [facility] [place] Kandel, Ninel, MD    Office Visit    1 year ago Essential hypertension    [facility] [place] Vetrone, Laura, MD    Telemedicine    1 year ago [facility] left lower extremity    [facility]    Office Visit    1 year ago Routine health maintenance    [facility] [place] Vetrone, Laura, MD    Office Visit    2 years ago TIA (transient ischemic attack)    [facility] [place] Vetrone, Laura, MD    Office Visit                        sertraline 50 MG Oral Tab 90 tablet 3     Sig: Take 1 tablet (50 mg total) by mouth daily.       Psychiatric Non-Scheduled (Anti-Anxiety) Passed - 10/29/2024  9:32 AM        Passed - In person appointment or virtual visit in the past 6 mos or appointment in next 3 mos     Recent Outpatient Visits              4 months ago Physical exam, routine    [facility] [place] Kandel, Ninel, MD    Office Visit    1 year ago Essential hypertension    [facility] [place] Vetrone, Laura, MD    Telemedicine     1 year ago Pain of left lower extremity    [facility] Laura, MD    Office Visit    1 year ago Routine health maintenance    [facility] [place] Vetrone, Laura, MD    Office Visit    2 years ago TIA (transient ischemic attack)    [facility] [place] Vetrone, Laura, MD    Office Visit                      Passed - Depression Screening completed within the past 12 months           Recent Outpatient Visits              4 months ago Physical exam, routine    [facility] [place] Kandel, Ninel, MD    Office Visit    1 year ago Essential hypertension    [facility] [place] Vetrone, Laura, MD    Telemedicine    1 year ago Pain of left lower extremity    [facility]    Office Visit    1 year ago Routine health maintenance    [facility] [place] Vetrone, Laura, MD    Office Visit    2 years ago TIA (transient ischemic attack)    [facility] [place] Vetrone, Laura, MD    Office Visit

## 2024-10-31 DIAGNOSIS — I10 ESSENTIAL HYPERTENSION: ICD-10-CM

## 2024-11-01 DIAGNOSIS — E78.00 HYPERCHOLESTEROLEMIA: ICD-10-CM

## 2024-11-01 NOTE — TELEPHONE ENCOUNTER
Silvio Benítez  My pharmacist is telling me that a refill approval is needed for Atovastatin. I’m getting to the refill date. Can you please approve this for me?     Thank you so much   Cony

## 2024-11-01 NOTE — TELEPHONE ENCOUNTER
Please review pended refill request as unable to refill due to high/very high interaction warning copied here:    High  Drug-Drug: dilTIAZem HCl ER Coated Beads and atorvastatinPlasma concentrations and pharmacologic effects of atorvastatin may be increased by coadministration of diltiazem. The risk of myopathy and rhabdomyolysis may be increased.  Details    Requested Prescriptions   Pending Prescriptions Disp Refills    atorvastatin 10 MG Oral Tab 90 tablet 3     Sig: Take 1 tablet (10 mg total) by mouth daily.       Cholesterol Medication Protocol Passed - 11/1/2024 10:41 AM        Passed - ALT < 80     Lab Results   Component Value Date    ALT 14 06/22/2024             Passed - ALT resulted within past year        Passed - Lipid panel within past 12 months     Lab Results   Component Value Date    CHOLEST 153 06/22/2024    TRIG 75 06/22/2024    HDL 49 06/22/2024    LDL 89 06/22/2024    VLDL 12 06/22/2024    TCHDLRATIO 2.9 12/21/2021    NONHDLC 104 06/22/2024             Passed - In person appointment or virtual visit in the past 12 mos or appointment in next 3 mos     Recent Outpatient Visits              4 months ago Physical exam, routine    [facility] [address] [place] Jackelin Humphrey MD    Office Visit    1 year ago Essential hypertension    [facility] [place] Vetrone, Laura, MD    Telemedicine    1 year ago [facility] left lower [facility]    Office Visit    1 year ago Routine health maintenance    [facility] [place] Vetrone, Laura, MD    Office Visit    2 years ago TIA (transient ischemic attack)    [facility] [place] Vetrone, Laura, MD    Office Visit                               Recent Outpatient Visits              4 months ago Physical exam, routine    [facility] [address], [place]  Jackelin Humphrey MD    Office Visit    1 year ago Essential hypertension    [facility] [place] Vetrone, Laura, MD    Telemedicine    1 year ago Pain of left lower extremity    [facility] [place] Caesar Weber Laura, MD    Office Visit    1 year ago Routine health maintenance    [facility] [place] Vetrone, Laura, MD    Office Visit    2 years ago TIA (transient ischemic attack)    [facility] [place] Vetrone, Laura, MD    Office Visit

## 2024-11-04 RX ORDER — ATORVASTATIN CALCIUM 10 MG/1
10 TABLET, FILM COATED ORAL DAILY
Qty: 90 TABLET | Refills: 2 | Status: SHIPPED | OUTPATIENT
Start: 2024-11-04

## 2024-11-05 RX ORDER — DILTIAZEM HYDROCHLORIDE 180 MG/1
180 CAPSULE, COATED, EXTENDED RELEASE ORAL DAILY
Qty: 90 CAPSULE | Refills: 1 | OUTPATIENT
Start: 2024-11-05

## 2024-12-12 DIAGNOSIS — F41.9 ANXIETY: ICD-10-CM

## 2024-12-16 RX ORDER — NORTRIPTYLINE HYDROCHLORIDE 10 MG/1
20 CAPSULE ORAL DAILY
Qty: 180 CAPSULE | Refills: 1 | OUTPATIENT
Start: 2024-12-16

## 2024-12-20 DIAGNOSIS — I10 ESSENTIAL HYPERTENSION: ICD-10-CM

## 2024-12-26 RX ORDER — LOSARTAN POTASSIUM 100 MG/1
TABLET ORAL
Qty: 90 TABLET | Refills: 1 | Status: SHIPPED | OUTPATIENT
Start: 2024-12-26

## 2024-12-26 NOTE — TELEPHONE ENCOUNTER
----- Message from Halle Fung [phone]  Additional Information:    Please review;  [facility] protocol failed/ No protocol       No future labs noted       Requested Prescriptions   Pending Prescriptions Disp Refills    losartan 100 MG Oral Tab 90 tablet 3     Sig: TAKE 1 TABLET BY MOUTH DAILY       Hypertension Medications Protocol Failed - 12/26/2024  2:27 PM        Failed - EGFRCR or GFRNAA > 50     GFR Evaluation  EGFRCR: 34 , resulted on 6/22/2024          Passed - CMP or BMP in past 12 months        Passed - Last BP reading less than 140/90     BP Readings from Last 1 Encounters:   06/18/24 131/79               Passed - In person appointment or virtual visit in the past 12 mos or appointment in next 3 mos     Recent Outpatient Visits              6 months ago Physical exam, routine    [facility] [place] Kandel, Ninel, MD    Office Visit    1 year ago Essential hypertension    [facility] [place] Vetrone, Laura, MD    Telemedicine    1 year ago Pain of left lower extremity    [facility]    Office Visit    1 year ago Routine health maintenance    [facility] [place] Vetrone, Laura, MD    Office Visit    3 years ago TIA (transient ischemic attack)    [facility] [place] Vetrone, Laura, MD    Office Visit                           Recent Outpatient Visits              6 months ago Physical exam, routine    [facility] [place] Kandel, Ninel, MD    Office Visit    1 year ago Essential hypertension    [facility] [place] Vetrone, Laura, MD    Telemedicine    1 year ago Pain of left lower extremity    [facility]    Office Visit    1 year ago Routine health maintenance    [facility] [place] Vetrone, Laura, MD     Office Visit    3 years ago TIA (transient ischemic attack)    [facility] [address], [place] Emely Machuca MD    Office Visit

## 2025-01-30 DIAGNOSIS — F41.9 ANXIETY: ICD-10-CM

## 2025-03-13 DIAGNOSIS — I10 ESSENTIAL HYPERTENSION: ICD-10-CM

## 2025-03-17 RX ORDER — DILTIAZEM HYDROCHLORIDE 180 MG/1
180 CAPSULE, COATED, EXTENDED RELEASE ORAL DAILY
Qty: 90 CAPSULE | Refills: 1 | OUTPATIENT
Start: 2025-03-17

## 2025-05-02 DIAGNOSIS — E78.00 HYPERCHOLESTEROLEMIA: ICD-10-CM

## 2025-05-05 RX ORDER — ATORVASTATIN CALCIUM 10 MG/1
10 TABLET, FILM COATED ORAL DAILY
Qty: 90 TABLET | Refills: 2 | OUTPATIENT
Start: 2025-05-05

## 2025-05-16 DIAGNOSIS — F41.9 ANXIETY: ICD-10-CM

## 2025-05-16 RX ORDER — NORTRIPTYLINE HYDROCHLORIDE 10 MG/1
20 CAPSULE ORAL DAILY
Qty: 180 CAPSULE | Refills: 1 | OUTPATIENT
Start: 2025-05-16

## 2025-05-16 RX ORDER — NORTRIPTYLINE HYDROCHLORIDE 10 MG/1
20 CAPSULE ORAL DAILY
Qty: 180 CAPSULE | Refills: 1 | Status: SHIPPED | OUTPATIENT
Start: 2025-05-16

## 2025-06-28 NOTE — TELEPHONE ENCOUNTER
Please review; protocol failed/ has no protocol      No active /future labs noted   Message sent for patient to make an appointment.

## 2025-07-07 NOTE — TELEPHONE ENCOUNTER
Please review.  Protocol failed/has no protocol.    Last Office Visit: 06/18/2024  Please see message below for upcoming appointment.    Future Appointments   Date Time Provider Department Center   7/16/2025  2:20 PM Jackelin Humphrey MD [id number] [place]     Please advise if refill is appropriate.

## (undated) NOTE — MR AVS SNAPSHOT
[facility]  [address]  [phone]               Thank you for choosing us for your health care visit with Tai Bryant MD.  We are glad to serve you and happy to provide you with this summa aspirin 325 MG Tabs   one tab daily           atorvastatin 10 MG Tabs   PLEASE DO BLOOD TESTS.  1 daily.    Commonly known as:  LIPITOR           losartan 100 MG Tabs   TAKE 1 TABLET BY MOUTH EVERY DAY   Commonly known as:  COZAAR           Nortriptyline H

## (undated) NOTE — MR AVS SNAPSHOT
[facility]  [address] [place] [address], [address]  [id number] [place] [address] [phone]               Thank you for choosing us for your health care visit with Esha Barrera MD.  We are glad to serve you and happy to provide you with this summary of y Commonly known as:  NORVASC           aspirin 325 MG Tabs   one tab daily           Atorvastatin Calcium 10 MG Tabs   Take 1 tablet (10 mg total) by mouth daily.    What changed:  additional instructions   Commonly known as:  LIPITOR           losartan 100 Facility, call Central Scheduling at [phone], Monday through Friday between 7:30am to 6pm and on Saturday between 8am and 1pm. Evening and weekend appointments for your exam are available. Walk-in patients are welcome for most exams.      [place] H EAT THESE FOODS MORE OFTEN: EAT THESE FOODS LESS OFTEN:   Make half your plate fruits and vegetables Highly refined, white starches including white bread, rice and pasta   Eat plenty of protein, keep the fat content low Sugars:  sodas and sports drinks, ca

## (undated) NOTE — LETTER
October 28, 2021    MD Jennyfer Marshall Alberto [facility] [address]     Patient: Hoda Fuentes   YOB: 1950   Date of Visit: 10/28/2021       Dear Dr. Lundy Schilder, MD:    Thank you for referring Hoda Fuentes to me for evaluation.  Here capsules (20 mg total) by mouth daily. 180 capsule 1   • dilTIAZem HCl ER Coated Beads 180 MG Oral Capsule SR 24 Hr Take 1 capsule (180 mg total) by mouth daily.  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

## (undated) NOTE — LETTER
11/23/2021            Cony Gonzalez        [address] [place] [address]       Dear Aggie Green,    The visual field test you took on 11/23/2021 indicated normal field of vision in both eyes.   An optic nerve analysis showed normal retinal nerve

## (undated) NOTE — ED AVS SNAPSHOT
Shoshana Hahn   MRN: [id number]    Department:  [facility] Emergency Department   Date of Visit:  10/12/2019           Disclosure     Insurance plans vary and the physician(s) referred by the ER may not be covered by your plan.  Please contact yo within the next three months to obtain basic health screening including reassessment of your blood pressure.     IF THERE IS ANY CHANGE OR WORSENING OF YOUR CONDITION, CALL YOUR PRIMARY CARE PHYSICIAN AT ONCE OR RETURN IMMEDIATELY TO THE EMERGENCY DEPARTMEN

## (undated) NOTE — Clinical Note
May 31, 2017    Esha Barrera MD  [address] [place] [address]     Patient: Umberto Valadez   YOB: 1950   Date of Visit: 5/31/2017       Dear Dr. Shasha De Jesus MD:    Thank you for referring Umberto Valadez to me for evaluation.  Here is m Aspirin 325 MG Oral Tab one tab daily Disp:  Rfl:        Allergies:  No Known Allergies    ROS:     ROS     Positive for: Endocrine    Negative for: Constitutional, Gastrointestinal, Neurological, Skin, Genitourinary, Musculoskeletal, HENT, Cardiovascular, Type:  Progressive bifocal      Manifest Refraction     Not tested.  Patient wants to stay with her current glasses/nw                 ASSESSMENT/PLAN:     Diagnoses and Plan:     Glaucoma suspect of both eyes  Patient is a glaucoma suspect due to increase